# Patient Record
Sex: FEMALE | Race: WHITE | Employment: FULL TIME | URBAN - METROPOLITAN AREA
[De-identification: names, ages, dates, MRNs, and addresses within clinical notes are randomized per-mention and may not be internally consistent; named-entity substitution may affect disease eponyms.]

---

## 2020-01-21 ENCOUNTER — ED HISTORICAL/CONVERTED ENCOUNTER (OUTPATIENT)
Dept: OTHER | Age: 65
End: 2020-01-21

## 2020-03-19 ENCOUNTER — OFFICE VISIT (OUTPATIENT)
Dept: NEUROLOGY | Age: 65
End: 2020-03-19

## 2020-03-19 VITALS
WEIGHT: 144 LBS | OXYGEN SATURATION: 96 % | SYSTOLIC BLOOD PRESSURE: 126 MMHG | HEIGHT: 67 IN | BODY MASS INDEX: 22.6 KG/M2 | HEART RATE: 73 BPM | TEMPERATURE: 98.5 F | DIASTOLIC BLOOD PRESSURE: 86 MMHG | RESPIRATION RATE: 16 BRPM

## 2020-03-19 DIAGNOSIS — G43.111 INTRACTABLE MIGRAINE WITH AURA WITH STATUS MIGRAINOSUS: Primary | ICD-10-CM

## 2020-03-19 DIAGNOSIS — G51.39 HEMIFACIAL SPASM: ICD-10-CM

## 2020-03-19 RX ORDER — LEVOTHYROXINE AND LIOTHYRONINE 38; 9 UG/1; UG/1
TABLET ORAL DAILY
COMMUNITY

## 2020-03-19 RX ORDER — RIZATRIPTAN BENZOATE 10 MG/1
10 TABLET ORAL
COMMUNITY
End: 2020-06-15

## 2020-03-19 RX ORDER — SUMATRIPTAN 100 MG/1
100 TABLET, FILM COATED ORAL
COMMUNITY
End: 2020-06-15

## 2020-03-19 RX ORDER — SPIRONOLACTONE 100 MG/1
200 TABLET, FILM COATED ORAL DAILY
COMMUNITY

## 2020-03-19 RX ORDER — ACETAMINOPHEN 500 MG
10000 TABLET ORAL
COMMUNITY

## 2020-03-19 RX ORDER — METAXALONE 800 MG/1
800 TABLET ORAL
COMMUNITY
End: 2020-06-15

## 2020-03-19 NOTE — PROGRESS NOTES
[facility] and [address] at [facility] at [facility] [address], [address] [facility] 2497 [address] [facility], [address] [place] [address]  [phone] Office  [phone] Facsimile           Referring: Randall Schmidt MD      Chief Complaint   Patient presents with    New Patient    Migraine     66-year-old right-handed woman who comes today for evaluation of what she calls increasing migraine and cheek twitching. In terms of the cheek twitching she has a video on her cell phone that she shows me where she has twitching of the lower portion of her left cheek. This is not rhythmic. It sporadic. She says she had this in January with no inciting factor. It went on for about a week and then spontaneously resolved. She had no closing of the eye with this. No loss or alteration in consciousness. No version of the head. No other associated symptoms. Again never had it before and has not had it since. In terms of the headache she has had headaches for over 20 years. She notes that they would vary in terms of their frequency and intensity but over the last several months they have increased both in their frequency and their intensity. The difficulty she has at this point is that they are lasting several days when she gets them. She says the last 3 days and she is been unable to find an effective abortive agent. Typically they occur in the top of her head temporal or between the eyes. They are described as a pressure throbbing type sensation. She has photophobia and phonophobia sometimes nausea but does not vomit. Does not hurt to move. She does get scintillating scotoma lita-shaped at the onset and sometimes persist throughout the headache and the last headache she had this persisted throughout. She has not had any focal deficits with them. No loss or alteration in consciousness with them.   No weakness numbness tingling. She has never lost consciousness with a headache. Her blood pressure has been controlled. No history of heart disease. She has been using Imitrex and Maxalt to try to abort them. She takes these early right at the onset of her aura and she does repeat a second dose generally at 4 hours not 2 hours but she does not get relief. She did have a good stretch of time over November through January where she had minimal headaches. They have come back with a vengeance occurring once a week now and again lasting 3 days so she is having at least 12-15 headache days per month now. No chest pain palpitations shortness of breath fever chills rash. Past Medical History:   Diagnosis Date    HTN (hypertension)     Hypothyroidism        Past Surgical History:   Procedure Laterality Date    HX HYSTERECTOMY  1979       Current Outpatient Medications   Medication Sig Dispense Refill    spironolactone (ALDACTONE) 100 mg tablet Take 200 mg by mouth daily.  thyroid, Pork, (ARMOUR) 60 mg tablet Take  by mouth daily.  rizatriptan (MAXALT) 10 mg tablet Take 10 mg by mouth once as needed for Migraine. May repeat in 2 hours if needed      SUMAtriptan (IMITREX) 100 mg tablet Take 100 mg by mouth once as needed for Migraine.  metaxalone (SKELAXIN) 800 mg tablet Take 800 mg by mouth three (3) times daily as needed for Muscle Spasm(s).  cholecalciferol (VITAMIN D3) (2,000 UNITS /50 MCG) cap capsule Take 10,000 Units by mouth every Monday, Wednesday, Friday.           Allergies   Allergen Reactions    Iodinated Contrast Media Other (comments)     \"BP plummets\"       Social History     Tobacco Use    Smoking status: Current Every Day Smoker     Packs/day: 0.25    Smokeless tobacco: Never Used   Substance Use Topics    Alcohol use: Not on file    Drug use: Not Currently       Family History   Problem Relation Age of Onset    Breast Cancer Mother     Heart Disease Father    Gabino Whaley Stroke Father     MS Other     Headache Other         cluster       Review of Systems  Pertinent positives and negatives as noted with remainder of comprehensive review negative    Examination  Visit Vitals  /86 (BP 1 Location: Left arm, BP Patient Position: Sitting)   Pulse 73   Temp 98.5 °F (36.9 °C) (Oral)   Resp 16   Ht 5' 6.75\" (1.695 m)   Wt 65.3 kg (144 lb)   SpO2 96%   BMI 22.72 kg/m²     Pleasant, well appearing. Dress and grooming are appropriate. No scleral icterus is present. Oropharynx is clear. Supple neck without bruit appreciated. Heart regular. Pulses are symmetrical.  No edema in the lower extremities. Neurologically, she is awake, alert, and oriented with normal speech and language. Her cognition is normal.    Intact cranial nerves 2-12. No nystagmus. Visual fields full to confrontation. Disk margins are flat bilaterally. She has normal bulk and tone. She has no abnormal movement. She has no pronation or drift. She generates full strength in the upper and lower extremities to direct confrontational testing. Reflexes are symmetrical in the upper and lower extremities bilaterally. No pathologic reflexes are elicited. .  Finger nose finger and rapid alternating movements are normal.  Steady gait. No sensory deficit to primary modalities. The video reviewed on her cell phone demonstrating left sided hemifacial spasm    Impression/Plan  66-year-old lady with a self-limited episode of left-sided hemifacial spasm. We discussed this. Given that it self-limited not much to do at this point. Watchful course. If it returns we discussed Botox and if it returns and I would send her for some additional study. She does note that she has had a recent carotid Doppler and that was normal.    She does have intractable migraine headaches and the problem here is that she does not have an effective abortive regimen.   Given the number of headache days she certainly meets criteria for preventative therapy and we discussed migraine and migraine pathophysiology specifically in the context of CGRP and we discussed using the CGRP inhibitors. We did discuss using preventative strategies however we also discussed that we could take a more directed and minimal medication approach in that if we could use the CGRP inhibition and an abortive fashion to try to treat the headaches more effectively when she gets them we could save her from having to use a preventative strategy. She would like to try that first.  To that end we will use Ubrelvy 100 mg tablets 1 at headache onset repeat in 2 hours if needed. Discussed studies, potential side effects, potential benefits and alternatives. Track headaches as she is doing as well as track response meaning number of tablets needed to effectively abort her headache. I like to see that date after 2 months and when she returns if this is an effective strategy we will continue it. If not we will move on to CGRP inhibition in a preventative role    Follow in 2 months    Vini Kee MD    This note was created using voice recognition software. Despite editing, there may be syntax errors. This note will not be viewable in [address]

## 2020-03-19 NOTE — PROGRESS NOTES
Chief Complaint   Patient presents with    New Patient    Migraine     Has had off and on for 20 yrs, has progressed over the last 8 mo, has not tried any preventative meds    Has had 5 over the last mo, prior to 8 mo ago, would have one maybe every few mo

## 2020-03-19 NOTE — PATIENT INSTRUCTIONS
RESULT POLICY If we have ordered testing for you, know that; \"NO NEWS IS GOOD NEWS! \" It is our policy that we know longer call patients with results, nor do we  give test results over the phone. We schedule follow up appointments so that your results can be discussed in person. This allows you to address any questions you have regarding the results. If you choose to go to an imaging center outside of [facility], it is your responsibility to bring imaging report and disc to follow up appointment. If something of concern is revealed on your test, we will contact you to discuss the matter and if needed schedule a sooner follow up appointment. Additionally, results may be found by using the My Chart feature and one of our patient service representatives at the  can give you instructions on how to access this feature to utilize our electronic medical record system. Thank you for your understanding. PRESCRIPTION REFILL POLICY [facility] Statement to Patients April 1, 2014 In an effort to ensure the large volume of patient prescription refills is processed in the most efficient and expeditious manner, we are asking our patients to assist us by calling your Pharmacy for all prescription refills, this will include also your  Mail Order Pharmacy. The pharmacy will contact our office electronically to continue the refill process. Please do not wait until the last minute to call your pharmacy. We need at least 48 hours (2days) to fill prescriptions. We also encourage you to call your pharmacy before going to  your prescription to make sure it is ready. With regard to controlled substance prescription refill requests (narcotic refills) that need to be picked up at our office, we ask your cooperation by providing us with at least 72 hours (3days) notice that you will need a refill. We will not refill narcotic prescription refill requests after 4:00pm on any weekday, Monday through Thursday, or after 2:00pm on Fridays, or on the weekends. We encourage everyone to explore another way of getting your prescription refill request processed using threadsy, our patient web portal through our electronic medical record system. threadsy is an efficient and effective way to communicate your medication request directly to the office and  downloadable as an tara on your smart phone . threadsy also features a review functionality that allows you to view your medication list as well as leave messages for your physician. Are you ready to get connected? If so please review the attatched instructions or speak to any of our staff to get you set up right away! Thank you so much for your cooperation. Should you have any questions please contact our Practice Administrator.

## 2020-03-21 ENCOUNTER — PATIENT MESSAGE (OUTPATIENT)
Dept: NEUROLOGY | Age: 65
End: 2020-03-21

## 2020-03-23 ENCOUNTER — TELEPHONE (OUTPATIENT)
Dept: NEUROLOGY | Age: 65
End: 2020-03-23

## 2020-03-23 NOTE — TELEPHONE ENCOUNTER
Prior Authorization APPROVED for [employer] by [employer]. Effective dates 03/23/20 - 03/23/21. Case #[id number]. Approval will be scanned into media.  Pharmacy has been notified of approval.

## 2020-03-23 NOTE — TELEPHONE ENCOUNTER
PA initiated through Cover  meds key # [id number] PA Case [id number] Status: Approved.     Pt notified via [employer]

## 2020-03-27 ENCOUNTER — TELEPHONE (OUTPATIENT)
Dept: NEUROLOGY | Age: 65
End: 2020-03-27

## 2020-03-27 NOTE — TELEPHONE ENCOUNTER
----- Message from Ross Kay sent at 3/27/2020 11:10 AM EDT -----  Regarding: Dr. Dawn Dance Message/Vendor Calls    Caller's first and last name: [facility]      Reason for call: Change Rx \"Ubrevly 100 mg\" to 50 mg. The 100 mg is not available.        Callback required yes/no and why: yes      Best contact number(s): [phone]      Details to clarify the request:      Ross Kay

## 2020-03-27 NOTE — TELEPHONE ENCOUNTER
michel laug was sent previously for pt to have this transferred to [facility] MICHELLE [facility] BENJY

## 2020-04-22 DIAGNOSIS — G43.111 INTRACTABLE MIGRAINE WITH AURA WITH STATUS MIGRAINOSUS: ICD-10-CM

## 2020-05-14 ENCOUNTER — TELEPHONE (OUTPATIENT)
Dept: NEUROLOGY | Age: 65
End: 2020-05-14

## 2020-05-14 ENCOUNTER — VIRTUAL VISIT (OUTPATIENT)
Dept: NEUROLOGY | Age: 65
End: 2020-05-14

## 2020-05-14 DIAGNOSIS — G43.111 INTRACTABLE MIGRAINE WITH AURA WITH STATUS MIGRAINOSUS: Primary | ICD-10-CM

## 2020-05-14 NOTE — PROGRESS NOTES
Chief Complaint   Patient presents with    Follow-up     virtual visit--follow up migraines, using Ubrelvy. H/as March 11, 15,16 (2 pills on the 16) also sometimes with auras.

## 2020-05-14 NOTE — PROGRESS NOTES
[facility]   [address]    [place]    Fax        Anupam England is a 59 y.o. female who was seen by synchronous (real-time) audio-video technology on 5/14/2020. Consent:  She and/or her healthcare decision maker is aware that this patient-initiated Telehealth encounter is a billable service, with coverage as determined by her insurance carrier. She is aware that she may receive a bill and has provided verbal consent to proceed: Yes    I was in the office while conducting this encounter. Subjective:   Anupam England was seen for Follow-up (virtual visit--follow up migraines, using Ubrelvy. H/as March 11, 15,16 (2 pills on the 16) also sometimes with auras.)    72-year-old lady seen today for follow-up migraine headache. Her last visit with me was 23 March where I saw her in initial consultation. At that time we started [place] [employer] [place] and had her track her headaches. She notes that she had headaches on may 6 6, 10, 11. She had difficulty getting the Ubrelvy as there were supply chain issues. She has not used it consistently. Prior to Admission medications    Medication Sig Start Date End Date Taking? Authorizing Provider   lisdexamfetamine (Vyvanse) 20 mg capsule Take 1 Cap by mouth daily. Max Daily Amount: 20 mg. 5/14/20  Yes Jesse Ying MD   ubrogepant Veronica Grand) 50 mg tablet Use 1-2 tabs po at HA onset, may repeat in 2 hrs prn for max dose of 4 tabs in 24 hrs 4/22/20  Yes Andre Melchor MD   spironolactone (ALDACTONE) 100 mg tablet Take 200 mg by mouth daily. Yes Provider, Historical   thyroid, Pork, (ARMOUR) 60 mg tablet Take  by mouth daily. Yes Provider, Historical   rizatriptan (MAXALT) 10 mg tablet Take 10 mg by mouth once as needed for Migraine.  May repeat in 2 hours if needed   Yes Provider, Historical   SUMAtriptan (IMITREX) 100 mg tablet Take 100 mg by mouth once as needed for Migraine. Yes Provider, Historical   metaxalone (SKELAXIN) 800 mg tablet Take 800 mg by mouth three (3) times daily as needed for Muscle Spasm(s). Yes Provider, Historical   cholecalciferol (VITAMIN D3) (2,000 UNITS /50 MCG) cap capsule Take 10,000 Units by mouth every Monday, Wednesday, Friday. Yes Provider, Historical     Allergies   Allergen Reactions    Iodinated Contrast Media Other (comments)     \"BP plummets\"         Examination  Was not lady. Awake alert oriented and conversant. Normal speech and language. Normal cognitive function. Due to this being a TeleHealth evaluation, many elements of the physical examination are unable to be assessed. Impression/Plan  Very nice lady with intractable migraine. We discussed options today. We are going to continue to use the He[place] consistently over the next month. We will get back together in 1 month and depending on her headache frequency we will do the following: If she has under 4 headaches per month and the Heide is effective we will continue as is  If she has greater than 4 headaches per month we will discuss using a CGRP preventative  If the [place] is ineffective in any regard we will change to Nurtec    Follow-up in about 1 month    Pursuant to the emergency declaration under the [address] and the [facility], 113 waiver authority and the [employer] General Act, this Virtual  Visit was conducted, with patient's consent, to reduce the patient's risk of exposure to COVID-19 and provide continuity of care for an established patient. Services were provided through a video synchronous discussion virtually to substitute for in-person clinic visit. Velta Burkitt, MD     This document was created with the assistance of voice recognition software and therefore unintended syntax errors may be present despite editing. For any questions please contact me directly. This note will not be viewable in [address]

## 2020-06-12 NOTE — PROGRESS NOTES
Chief Complaint   Patient presents with    Migraine     Migraine f/u.  3 HAs in since 5/14/20. Very happy with Melisa Lopez.   Only taking spironolactone, Ubrelvy, MVI, and Ulysses for thyroid

## 2020-06-15 ENCOUNTER — VIRTUAL VISIT (OUTPATIENT)
Dept: NEUROLOGY | Age: 65
End: 2020-06-15

## 2020-06-15 DIAGNOSIS — G43.111 INTRACTABLE MIGRAINE WITH AURA WITH STATUS MIGRAINOSUS: Primary | ICD-10-CM

## 2020-06-15 NOTE — PROGRESS NOTES
[address] [facility] [address]   [place] [address]    [facility]    Fax        Linda Fair is a 59 y.o. female who was seen by synchronous (real-time) audio-video technology on 6/15/2020. Consent:  She and/or her healthcare decision maker is aware that this patient-initiated Telehealth encounter is a billable service, with coverage as determined by her insurance carrier. She is aware that she may receive a bill and has provided verbal consent to proceed: Yes    I was in the office while conducting this encounter. Subjective:   Linda Fair was seen for Migraine    51-year-old lady seen today for follow-up migraine headache. Her last visit was May 14. I wanted to see her back in about a month as we were right on the edge of needing to be on a preventative. She is using Ubrelvy. Today she reports she has been doing well. She only had 2 migraines. The Collie Standing took them away. They came back to back so she had some recurrence. No side effect. Happy with how she is doing. Prior to Admission medications    Medication Sig Start Date End Date Taking? Authorizing Provider   multivitamin, tx-iron-ca-min (THERA-M w/ IRON) 9 mg iron-400 mcg tab tablet Take 1 Tab by mouth daily. Yes Provider, Historical   ubrogepant Drusilla Schhiralke) 50 mg tablet Use 1-2 tabs po at HA onset, may repeat in 2 hrs prn for max dose of 4 tabs in 24 hrs 4/22/20  Yes Chas Melchor MD   spironolactone (ALDACTONE) 100 mg tablet Take 200 mg by mouth daily. Yes Provider, Historical   thyroid, Pork, (ARMOUR) 60 mg tablet Take  by mouth daily. Yes Provider, Historical   cholecalciferol (VITAMIN D3) (2,000 UNITS /50 MCG) cap capsule Take 10,000 Units by mouth every Monday, Wednesday, Friday. Yes Provider, Historical   rizatriptan (MAXALT) 10 mg tablet Take 10 mg by mouth once as needed for Migraine.  May repeat in 2 hours if needed    Provider, Historical SUMAtriptan (IMITREX) 100 mg tablet Take 100 mg by mouth once as needed for Migraine. Provider, Historical   metaxalone (SKELAXIN) 800 mg tablet Take 800 mg by mouth three (3) times daily as needed for Muscle Spasm(s). Provider, Historical     Allergies   Allergen Reactions    Iodinated Contrast Media Other (comments)     \"BP plummets\"       Examination  She is a very pleasant lady. Awake alert oriented and conversant. Speech and language normal.  Cognition normal.  No icterus. Symmetric face. No ataxia. Due to this being a TeleHealth evaluation, many elements of the physical examination are unable to be assessed. Impression/Plan  Migraine headaches doing well with abortive only treatment with Ubrelvy. Continue this. As long as she has no issues I will see her in 3-4 months. Pursuant to the emergency declaration under the [facility], [address]5 waiver authority and the [employer] Act, this Virtual  Visit was conducted, with patient's consent, to reduce the patient's risk of exposure to COVID-19 and provide continuity of care for an established patient. Services were provided through a video synchronous discussion virtually to substitute for in-person clinic visit. Aleksandra Burnham MD     This document was created with the assistance of voice recognition software and therefore unintended syntax errors may be present despite editing. For any questions please contact me directly. This note will not be viewable in [address]

## 2020-10-02 ENCOUNTER — VIRTUAL VISIT (OUTPATIENT)
Dept: NEUROLOGY | Age: 65
End: 2020-10-02
Payer: COMMERCIAL

## 2020-10-02 DIAGNOSIS — G43.919 INTRACTABLE MIGRAINE WITHOUT STATUS MIGRAINOSUS, UNSPECIFIED MIGRAINE TYPE: Primary | ICD-10-CM

## 2020-10-02 PROCEDURE — 99213 OFFICE O/P EST LOW 20 MIN: CPT | Performed by: NURSE PRACTITIONER

## 2020-10-02 RX ORDER — UBROGEPANT 100 MG/1
TABLET ORAL
COMMUNITY
Start: 2020-07-31 | End: 2020-10-02 | Stop reason: SDUPTHER

## 2020-10-02 RX ORDER — THYROID,PORK 65 MG
TABLET ORAL
COMMUNITY
Start: 2020-07-31

## 2020-10-02 RX ORDER — UBROGEPANT 100 MG/1
TABLET ORAL
Qty: 10 TAB | Refills: 5 | Status: SHIPPED | OUTPATIENT
Start: 2020-10-02

## 2020-10-02 NOTE — PROGRESS NOTES
Dipti Ingram is a 72 y.o. female who was seen by synchronous (real-time) audio-video technology on 10/2/2020 for Follow-up and Migraine (2 x migraines a month been doing good)      FU for migraines. Doing really well since last visit with Dr. Marty Libman. Was exposed to a child on her bus with COVID so is currently in quarantine but doing ok. She has not had a migraine in the month of September. Last migraine was last month in August.   Thinks due to weather. Garret Ingram works really well to help rescue in less then an hour. Has not really needed the 2nd dose at all. She feels like this is working well. No new symptoms with her migraines. She feels like things are going really well on no preventative. Assessment & Plan:       Subjective:       Prior to Admission medications    Medication Sig Start Date End Date Taking? Authorizing Provider   Ubrelvy 100 mg tablet TAKE ONE TABLET BY MOUTH AT THE ONSET OF A HEADACHE MAY REPEAT IN 2 HOURS IF NEEDED (NO MORE THAN 2 TABLETS IN 24 HOURS 7/31/20  Yes Provider, Historical   Nature-Throid 65 mg tab TAKE 1 TABLET BY MOUTH EVERY MORNING ON EMPTY STOMACH 7/31/20  Yes Provider, Historical   multivitamin, tx-iron-ca-min (THERA-M w/ IRON) 9 mg iron-400 mcg tab tablet Take 1 Tab by mouth daily. Yes Provider, Historical   spironolactone (ALDACTONE) 100 mg tablet Take 200 mg by mouth daily. Yes Provider, Historical   thyroid, Pork, (ARMOUR) 60 mg tablet Take  by mouth daily. Yes Provider, Historical   cholecalciferol (VITAMIN D3) (2,000 UNITS /50 MCG) cap capsule Take 10,000 Units by mouth every Monday, Wednesday, Friday. Yes Provider, Historical   ubrogepant Shanika Likens) 50 mg tablet Use 1-2 tabs po at HA onset, may repeat in 2 hrs prn for max dose of 4 tabs in 24 hrs 4/22/20 10/2/20  [place], Lin Pulido MD     There is no problem list on file for this patient. There are no active problems to display for this patient.     Current Outpatient Medications Medication Sig Dispense Refill    Ubrelvy 100 mg tablet TAKE ONE TABLET BY MOUTH AT THE ONSET OF A HEADACHE MAY REPEAT IN 2 HOURS IF NEEDED (NO MORE THAN 2 TABLETS IN 24 HOURS      Nature-Throid 65 mg tab TAKE 1 TABLET BY MOUTH EVERY MORNING ON EMPTY STOMACH      multivitamin, tx-iron-ca-min (THERA-M w/ IRON) 9 mg iron-400 mcg tab tablet Take 1 Tab by mouth daily.  spironolactone (ALDACTONE) 100 mg tablet Take 200 mg by mouth daily.  thyroid, Pork, (ARMOUR) 60 mg tablet Take  by mouth daily.  cholecalciferol (VITAMIN D3) (2,000 UNITS /50 MCG) cap capsule Take 10,000 Units by mouth every Monday, Wednesday, Friday. Allergies   Allergen Reactions    Iodinated Contrast Media Other (comments)     \"BP plummets\"     Past Medical History:   Diagnosis Date    HTN (hypertension)     Hypothyroidism      Past Surgical History:   Procedure Laterality Date    HX HYSTERECTOMY  1979     Family History   Problem Relation Age of Onset   [facility] Breast Cancer Mother     Heart Disease Father     Stroke Father     MS Other     Headache Other         cluster     Social History     Tobacco Use    Smoking status: Current Every Day Smoker     Packs/day: 0.25    Smokeless tobacco: Never Used   Substance Use Topics    Alcohol use: Not on file       Review of Systems   Eyes: Positive for blurred vision and photophobia. Negative for double vision. Gastrointestinal: Negative for nausea and vomiting. Neurological: Positive for headaches. Negative for dizziness, tingling, tremors and sensory change.        Objective:     Patient-Reported Vitals 10/1/2020   Patient-Reported Weight 146   Patient-Reported Height 5 61/2   Patient-Reported Pulse 67   Patient-Reported Temperature 96.4   Patient-Reported SpO2 98   Patient-Reported Systolic  358   Patient-Reported Diastolic 82        [INSTRUCTIONS:  \"[x]\" Indicates a positive item  \"[]\" Indicates a negative item  -- DELETE ALL ITEMS NOT EXAMINED]    Constitutional: [x] Appears well-developed and well-nourished [x] No apparent distress      [] Abnormal -     Mental status: [x] Alert and awake  [x] Oriented to person/place/time [x] Able to follow commands    [] Abnormal -     Eyes:   EOM    [x]  Normal    [] Abnormal -   Sclera  [x]  Normal    [] Abnormal -          Discharge [x]  None visible   [] Abnormal -     HENT: [x] Normocephalic, atraumatic  [] Abnormal -   [x] Mouth/Throat: Mucous membranes are moist    External Ears [x] Normal  [] Abnormal -    Neck: [x] No visualized mass [] Abnormal -     Pulmonary/Chest: [x] Respiratory effort normal   [x] No visualized signs of difficulty breathing or respiratory distress        [] Abnormal -      Musculoskeletal:   [x] Normal gait with no signs of ataxia         [x] Normal range of motion of neck        [] Abnormal -     Neurological:        [x] No Facial Asymmetry (Cranial nerve 7 motor function) (limited exam due to video visit)          [x] No gaze palsy        [] Abnormal -          Skin:        [x] No significant exanthematous lesions or discoloration noted on facial skin         [] Abnormal -            Psychiatric:       [x] Normal Affect [] Abnormal -        [x] No Hallucinations    Other pertinent observable physical exam findings:-    Migraines are doing really well with no prevention  She understands most triggers and tries to avoid. Keep Harl Dries for PRN rescue of migraines moving forward as this works really well. FU 6 months, sooner if needed. We discussed the expected course, resolution and complications of the diagnosis(es) in detail. Medication risks, benefits, costs, interactions, and alternatives were discussed as indicated. I advised her to contact the office if her condition worsens, changes or fails to improve as anticipated. She expressed understanding with the diagnosis(es) and plan.        Tova Echavarria, who was evaluated through a patient-initiated, synchronous (real-time) audio-video encounter, and/or her healthcare decision maker, is aware that it is a billable service, with coverage as determined by her insurance carrier. She provided verbal consent to proceed: Yes, and patient identification was verified. It was conducted pursuant to the emergency declaration under the [address] [place] [address], [address] [place] [address] authority and the [employer] Act. A caregiver was present when appropriate. Ability to conduct physical exam was limited. I was in the office. The patient was at home.       Kimi Chacko NP

## 2020-11-25 ENCOUNTER — APPOINTMENT (OUTPATIENT)
Dept: CT IMAGING | Age: 65
End: 2020-11-25
Attending: PHYSICIAN ASSISTANT
Payer: COMMERCIAL

## 2020-11-25 ENCOUNTER — APPOINTMENT (OUTPATIENT)
Dept: ULTRASOUND IMAGING | Age: 65
End: 2020-11-25
Attending: PHYSICIAN ASSISTANT
Payer: COMMERCIAL

## 2020-11-25 ENCOUNTER — HOSPITAL ENCOUNTER (EMERGENCY)
Age: 65
Discharge: HOME | End: 2020-11-25
Attending: EMERGENCY MEDICINE
Payer: COMMERCIAL

## 2020-11-25 VITALS
OXYGEN SATURATION: 99 % | HEART RATE: 83 BPM | WEIGHT: 144 LBS | SYSTOLIC BLOOD PRESSURE: 135 MMHG | RESPIRATION RATE: 18 BRPM | TEMPERATURE: 97.6 F | HEIGHT: 66 IN | DIASTOLIC BLOOD PRESSURE: 88 MMHG | BODY MASS INDEX: 23.14 KG/M2

## 2020-11-25 DIAGNOSIS — R10.84 ABDOMINAL PAIN, GENERALIZED: ICD-10-CM

## 2020-11-25 DIAGNOSIS — N83.8 OVARIAN MASS, RIGHT: Primary | ICD-10-CM

## 2020-11-25 LAB
ALBUMIN SERPL-MCNC: 4.1 G/DL (ref 3.5–5)
ALBUMIN/GLOB SERPL: 1.4 {RATIO} (ref 1.1–2.2)
ALP SERPL-CCNC: 67 U/L (ref 45–117)
ALT SERPL-CCNC: 17 U/L (ref 12–78)
ANION GAP SERPL CALC-SCNC: 5 MMOL/L (ref 5–15)
APPEARANCE UR: CLEAR
AST SERPL-CCNC: 15 U/L (ref 15–37)
BACTERIA URNS QL MICRO: NEGATIVE /HPF
BASOPHILS # BLD: 0 K/UL (ref 0–0.1)
BASOPHILS NFR BLD: 0 % (ref 0–1)
BILIRUB SERPL-MCNC: 0.5 MG/DL (ref 0.2–1)
BILIRUB UR QL: NEGATIVE
BUN SERPL-MCNC: 16 MG/DL (ref 6–20)
BUN/CREAT SERPL: 20 (ref 12–20)
CALCIUM SERPL-MCNC: 9.4 MG/DL (ref 8.5–10.1)
CHLORIDE SERPL-SCNC: 105 MMOL/L (ref 97–108)
CO2 SERPL-SCNC: 27 MMOL/L (ref 21–32)
COLOR UR: ABNORMAL
COMMENT, HOLDF: NORMAL
CREAT SERPL-MCNC: 0.82 MG/DL (ref 0.55–1.02)
DIFFERENTIAL METHOD BLD: ABNORMAL
EOSINOPHIL # BLD: 0.1 K/UL (ref 0–0.4)
EOSINOPHIL NFR BLD: 1 % (ref 0–7)
EPITH CASTS URNS QL MICRO: ABNORMAL /LPF
ERYTHROCYTE [DISTWIDTH] IN BLOOD BY AUTOMATED COUNT: 13 % (ref 11.5–14.5)
GLOBULIN SER CALC-MCNC: 2.9 G/DL (ref 2–4)
GLUCOSE SERPL-MCNC: 94 MG/DL (ref 65–100)
GLUCOSE UR STRIP.AUTO-MCNC: NEGATIVE MG/DL
HCT VFR BLD AUTO: 40.5 % (ref 35–47)
HGB BLD-MCNC: 13.5 G/DL (ref 11.5–16)
HGB UR QL STRIP: NEGATIVE
HYALINE CASTS URNS QL MICRO: ABNORMAL /LPF (ref 0–5)
IMM GRANULOCYTES # BLD AUTO: 0.1 K/UL (ref 0–0.04)
IMM GRANULOCYTES NFR BLD AUTO: 1 % (ref 0–0.5)
KETONES UR QL STRIP.AUTO: ABNORMAL MG/DL
LEUKOCYTE ESTERASE UR QL STRIP.AUTO: NEGATIVE
LIPASE SERPL-CCNC: 115 U/L (ref 73–393)
LYMPHOCYTES # BLD: 2.3 K/UL (ref 0.8–3.5)
LYMPHOCYTES NFR BLD: 25 % (ref 12–49)
MCH RBC QN AUTO: 30.8 PG (ref 26–34)
MCHC RBC AUTO-ENTMCNC: 33.3 G/DL (ref 30–36.5)
MCV RBC AUTO: 92.5 FL (ref 80–99)
MONOCYTES # BLD: 0.5 K/UL (ref 0–1)
MONOCYTES NFR BLD: 5 % (ref 5–13)
NEUTS SEG # BLD: 6.4 K/UL (ref 1.8–8)
NEUTS SEG NFR BLD: 68 % (ref 32–75)
NITRITE UR QL STRIP.AUTO: NEGATIVE
NRBC # BLD: 0 K/UL (ref 0–0.01)
NRBC BLD-RTO: 0 PER 100 WBC
PH UR STRIP: 5.5 [PH] (ref 5–8)
PLATELET # BLD AUTO: 273 K/UL (ref 150–400)
PMV BLD AUTO: 10 FL (ref 8.9–12.9)
POTASSIUM SERPL-SCNC: 4.4 MMOL/L (ref 3.5–5.1)
PROT SERPL-MCNC: 7 G/DL (ref 6.4–8.2)
PROT UR STRIP-MCNC: NEGATIVE MG/DL
RBC # BLD AUTO: 4.38 M/UL (ref 3.8–5.2)
RBC #/AREA URNS HPF: ABNORMAL /HPF (ref 0–5)
SAMPLES BEING HELD,HOLD: NORMAL
SODIUM SERPL-SCNC: 137 MMOL/L (ref 136–145)
SP GR UR REFRACTOMETRY: 1.01 (ref 1–1.03)
UR CULT HOLD, URHOLD: NORMAL
UROBILINOGEN UR QL STRIP.AUTO: 0.2 EU/DL (ref 0.2–1)
WBC # BLD AUTO: 9.4 K/UL (ref 3.6–11)
WBC URNS QL MICRO: ABNORMAL /HPF (ref 0–4)

## 2020-11-25 PROCEDURE — 36415 COLL VENOUS BLD VENIPUNCTURE: CPT

## 2020-11-25 PROCEDURE — 80053 COMPREHEN METABOLIC PANEL: CPT

## 2020-11-25 PROCEDURE — 81001 URINALYSIS AUTO W/SCOPE: CPT

## 2020-11-25 PROCEDURE — 76775 US EXAM ABDO BACK WALL LIM: CPT

## 2020-11-25 PROCEDURE — 83690 ASSAY OF LIPASE: CPT

## 2020-11-25 PROCEDURE — 85025 COMPLETE CBC W/AUTO DIFF WBC: CPT

## 2020-11-25 PROCEDURE — 74176 CT ABD & PELVIS W/O CONTRAST: CPT

## 2020-11-25 PROCEDURE — 74011250636 HC RX REV CODE- 250/636: Performed by: PHYSICIAN ASSISTANT

## 2020-11-25 PROCEDURE — 99284 EMERGENCY DEPT VISIT MOD MDM: CPT

## 2020-11-25 RX ORDER — ACETAMINOPHEN 325 MG/1
650 TABLET ORAL
Qty: 20 TAB | Refills: 0 | Status: SHIPPED | OUTPATIENT
Start: 2020-11-25

## 2020-11-25 RX ADMIN — SODIUM CHLORIDE 1000 ML: 900 INJECTION, SOLUTION INTRAVENOUS at 11:36

## 2020-11-25 NOTE — DISCHARGE INSTRUCTIONS
Patient Education        Pelvic Pain: Care Instructions  Your Care Instructions     Pelvic pain, or pain in the lower belly, can have many causes. Often pelvic pain is not serious and gets better in a few days. If your pain continues or gets worse, you may need tests and treatment. Tell your doctor about any new symptoms. These may be signs of a serious problem. Follow-up care is a key part of your treatment and safety. Be sure to make and go to all appointments, and call your doctor if you are having problems. It's also a good idea to know your test results and keep a list of the medicines you take. How can you care for yourself at home? · Rest until you feel better. Lie down, and raise your legs by placing a pillow under your knees. · Drink plenty of fluids. You may find that small, frequent sips are easier on your stomach than if you drink a lot at once. Avoid drinks with carbonation or caffeine, such as soda pop, tea, or coffee. · Try eating several small meals instead of 2 or 3 large ones. Eat mild foods, such as rice, dry toast or crackers, bananas, and applesauce. Avoid fatty and spicy foods, other fruits, and alcohol until 48 hours after your symptoms have gone away. · Take an over-the-counter pain medicine, such as acetaminophen (Tylenol), ibuprofen (Advil, Motrin), or naproxen (Aleve). Read and follow all instructions on the label. · Do not take two or more pain medicines at the same time unless the doctor told you to. Many pain medicines have acetaminophen, which is Tylenol. Too much acetaminophen (Tylenol) can be harmful. · You can put a heating pad, a warm cloth, or moist heat on your belly to relieve pain. When should you call for help?    Call your doctor now or seek immediate medical care if:    · You have a new or higher fever.     · You have unusual vaginal bleeding.     · You have new or worse belly or pelvic pain.     · You have vaginal discharge that has increased in amount or smells bad.   Watch closely for changes in your health, and be sure to contact your doctor if:    · You do not get better as expected. Where can you learn more? Go to http://www.gray.com/  Enter B514 in the search box to learn more about \"Pelvic Pain: Care Instructions. \"  Current as of: November 8, 2019               Content Version: 12.6  © 8476-9289 [employer]. Care instructions adapted under license by [employer] (which disclaims liability or warranty for this information). If you have questions about a medical condition or this instruction, always ask your healthcare professional. [place] [address] any warranty or liability for your use of this information.

## 2020-11-25 NOTE — ED TRIAGE NOTES
Pt arrives with the c.c of lower abdominal pain that radiates to the middle of back, denies any n/v/d/urinary symptoms. Pain has been going on for the last 36 hours intermittently; pt was seen by pcp and was told to come to the ED.

## 2020-11-25 NOTE — ED PROVIDER NOTES
80-year-old female with history of hypertension, hypothyroidism presents ambulatory for complaint of bilateral lower abdominal cramping shooting into her back for the past 36 hours, worse after eating. She states she had similar back pain in the past when she was constipated but states she had a normal bowel movement this morning and has not been feeling constipated recently. She saw her PCP this morning who evaluated her and states that they felt a pulsating mass in her abdomen and due to her complaint of abdominal pain recommended her to come to the ER to rule out an aortic aneurysm or dissection. Patient denies a personal history of aortic issues but states there is a family history of aneurysms. Only abdominal surgery was hysterectomy in 1979. She denies urinary symptoms, flank pain, cough, URI symptoms, fever, chills, upper abdominal pain, diarrhea, black or bloody stool, numbness or weakness in extremities or leg edema. She does not take a blood thinner.     Surgical historyhysterectomy 1979  Social history + tobacco, rare EtOH           Past Medical History:   Diagnosis Date    HTN (hypertension)     Hypothyroidism        Past Surgical History:   Procedure Laterality Date    HX HYSTERECTOMY  1979         Family History:   Problem Relation Age of Onset    Breast Cancer Mother     Heart Disease Father     Stroke Father     MS Other     Headache Other         cluster       Social History     Socioeconomic History    Marital status:      Spouse name: Not on file    Number of children: Not on file    Years of education: Not on file    Highest education level: Not on file   Occupational History    Not on file   Social Needs    Financial resource strain: Not on file    Food insecurity     Worry: Not on file     Inability: Not on file    Transportation needs     Medical: Not on file     Non-medical: Not on file   Tobacco Use    Smoking status: Current Every Day Smoker     Packs/day: 0.25    Smokeless tobacco: Never Used   Substance and Sexual Activity    Alcohol use: Not on file    Drug use: Not Currently    Sexual activity: Not on file   Lifestyle    Physical activity     Days per week: Not on file     Minutes per session: Not on file    Stress: Not on file   Relationships    Social connections     Talks on phone: Not on file     Gets together: Not on file     Attends [employer] service: Not on file     Active member of club or organization: Not on file     Attends meetings of clubs or organizations: Not on file     Relationship status: Not on file    Intimate partner violence     Fear of current or ex partner: Not on file     Emotionally abused: Not on file     Physically abused: Not on file     Forced sexual activity: Not on file   Other Topics Concern    Not on file   Social History Narrative    Not on file         ALLERGIES: Iodinated contrast media    Review of Systems   Constitutional: Negative. Negative for activity change, chills, fatigue and unexpected weight change. HENT: Negative for trouble swallowing. Respiratory: Negative for cough, chest tightness, shortness of breath and wheezing. Cardiovascular: Negative. Negative for chest pain and palpitations. Gastrointestinal: Positive for abdominal pain. Negative for diarrhea, nausea and vomiting. Lower abd cramping   Genitourinary: Negative. Negative for dysuria, flank pain, frequency, hematuria and pelvic pain. Musculoskeletal: Positive for back pain. Negative for arthralgias, neck pain and neck stiffness. Skin: Negative. Negative for color change and rash. Neurological: Negative. Negative for dizziness, numbness and headaches. All other systems reviewed and are negative. Vitals:    11/25/20 1043   BP: (!) 153/93   Pulse: 82   Resp: 18   Temp: 97.6 °F (36.4 °C)   SpO2: 100%   Weight: 65.3 kg (144 lb)   Height: 5' 6\" (1.676 m)            Physical Exam  Vitals signs and nursing note reviewed. Constitutional:       General: She is not in acute distress. Appearance: She is well-developed. She is not toxic-appearing or diaphoretic. Comments: Thin WF   HENT:      Head: Normocephalic and atraumatic. Eyes:      General:         Right eye: No discharge. Left eye: No discharge. Conjunctiva/sclera: Conjunctivae normal.      Pupils: Pupils are equal, round, and reactive to light. Neck:      Musculoskeletal: Full passive range of motion without pain and normal range of motion. Trachea: No tracheal tenderness. Cardiovascular:      Rate and Rhythm: Normal rate and regular rhythm. Pulses: Normal pulses. Heart sounds: Normal heart sounds. No murmur. No friction rub. No gallop. Pulmonary:      Effort: Pulmonary effort is normal. No respiratory distress. Breath sounds: Normal breath sounds. No wheezing or rales. Chest:      Chest wall: No tenderness. Abdominal:      General: Bowel sounds are normal. There is no distension. Palpations: Abdomen is soft. Tenderness: There is abdominal tenderness in the right lower quadrant and left lower quadrant. There is no guarding or rebound. Musculoskeletal: Normal range of motion. General: No tenderness. Skin:     General: Skin is warm and dry. Capillary Refill: Capillary refill takes less than 2 seconds. Findings: No abrasion, erythema or rash. Neurological:      Mental Status: She is alert and oriented to person, place, and time. Cranial Nerves: No cranial nerve deficit. Sensory: No sensory deficit.       Coordination: Coordination normal.   Psychiatric:         Speech: Speech normal.         Behavior: Behavior normal.          MDM  Number of Diagnoses or Management Options  Diagnosis management comments:   Ddx: Intra-abdominal infection, colitis, diverticulitis, appendicitis, pyelonephritis, UTI       Amount and/or Complexity of Data Reviewed  Clinical lab tests: ordered and reviewed  Tests in the radiology section of CPT®: ordered and reviewed  Review and summarize past medical records: yes  Discuss the patient with other providers: yes    Patient Progress  Patient progress: stable         Procedures    I discussed patient's PMH, exam findings as well as careplan with the ER attending who agrees with care plan. Summer Delgado PA-C    Patient has an allergy to iodinated contrast media, states she has had it twice in her life and both times she states \"my blood pressure dropped significantly, the last thing I remember before I passed out was them saying 60/40 and when I woke up I was in the ER. \"  I spoke with radiologist on call and discussed patient's severe side effect to IV dye causing significant hypotension and he recommends ultrasound of aorta and if abnormal will discuss options for CTA if still medically indicated. Summer Delgado PA-C      I discussed CT findings with patient and daughter at the bedside at her request. Advised CT shows mass of the R ovary, cannot r/o neoplasm and close f/u with gyh-onc encouraged. CONSULT NOTE:   3:08 PM  Summer Delgado PA-C spoke with Dr. Yesica Monroy,   Specialty: ONC/GYN  Discussed pt's hx, disposition, and available diagnostic and imaging results. Reviewed care plans. Consultant agrees with plans as outlined. He states due to the holiday the office is closing soon, will open Monday morning and advised to have patient call first thing Monday at 8;30am to have her be seen in the office next week. Written by Summer Delgado PA-C.    LABORATORY TESTS:  Recent Results (from the past 12 hour(s))   SAMPLES BEING HELD    Collection Time: 11/25/20 11:34 AM   Result Value Ref Range    SAMPLES BEING HELD GREEN,BLUE,RED     COMMENT        Add-on orders for these samples will be processed based on acceptable specimen integrity and analyte stability, which may vary by analyte.    CBC WITH AUTOMATED DIFF    Collection Time: 11/25/20 11:34 AM   Result Value Ref Range    WBC 9.4 3.6 - 11.0 K/uL    RBC 4.38 3.80 - 5.20 M/uL    HGB 13.5 11.5 - 16.0 g/dL    HCT 40.5 35.0 - 47.0 %    MCV 92.5 80.0 - 99.0 FL    MCH 30.8 26.0 - 34.0 PG    MCHC 33.3 30.0 - 36.5 g/dL    RDW 13.0 11.5 - 14.5 %    PLATELET 881 950 - 413 K/uL    MPV 10.0 8.9 - 12.9 FL    NRBC 0.0 0  WBC    ABSOLUTE NRBC 0.00 0.00 - 0.01 K/uL    NEUTROPHILS 68 32 - 75 %    LYMPHOCYTES 25 12 - 49 %    MONOCYTES 5 5 - 13 %    EOSINOPHILS 1 0 - 7 %    BASOPHILS 0 0 - 1 %    IMMATURE GRANULOCYTES 1 (H) 0.0 - 0.5 %    ABS. NEUTROPHILS 6.4 1.8 - 8.0 K/UL    ABS. LYMPHOCYTES 2.3 0.8 - 3.5 K/UL    ABS. MONOCYTES 0.5 0.0 - 1.0 K/UL    ABS. EOSINOPHILS 0.1 0.0 - 0.4 K/UL    ABS. BASOPHILS 0.0 0.0 - 0.1 K/UL    ABS. IMM. GRANS. 0.1 (H) 0.00 - 0.04 K/UL    DF AUTOMATED     METABOLIC PANEL, COMPREHENSIVE    Collection Time: 11/25/20 11:34 AM   Result Value Ref Range    Sodium 137 136 - 145 mmol/L    Potassium 4.4 3.5 - 5.1 mmol/L    Chloride 105 97 - 108 mmol/L    CO2 27 21 - 32 mmol/L    Anion gap 5 5 - 15 mmol/L    Glucose 94 65 - 100 mg/dL    BUN 16 6 - 20 MG/DL    Creatinine 0.82 0.55 - 1.02 MG/DL    BUN/Creatinine ratio 20 12 - 20      GFR est AA >60 >60 ml/min/1.73m2    GFR est non-AA >60 >60 ml/min/1.73m2    Calcium 9.4 8.5 - 10.1 MG/DL    Bilirubin, total 0.5 0.2 - 1.0 MG/DL    ALT (SGPT) 17 12 - 78 U/L    AST (SGOT) 15 15 - 37 U/L    Alk.  phosphatase 67 45 - 117 U/L    Protein, total 7.0 6.4 - 8.2 g/dL    Albumin 4.1 3.5 - 5.0 g/dL    Globulin 2.9 2.0 - 4.0 g/dL    A-G Ratio 1.4 1.1 - 2.2     LIPASE    Collection Time: 11/25/20 11:34 AM   Result Value Ref Range    Lipase 115 73 - 393 U/L   URINALYSIS W/MICROSCOPIC    Collection Time: 11/25/20  1:02 PM   Result Value Ref Range    Color YELLOW/STRAW      Appearance CLEAR CLEAR      Specific gravity 1.008 1.003 - 1.030      pH (UA) 5.5 5.0 - 8.0      Protein Negative NEG mg/dL    Glucose Negative NEG mg/dL    Ketone TRACE (A) NEG mg/dL Bilirubin Negative NEG      Blood Negative NEG      Urobilinogen 0.2 0.2 - 1.0 EU/dL    Nitrites Negative NEG      Leukocyte Esterase Negative NEG      WBC 0-4 0 - 4 /hpf    RBC 0-5 0 - 5 /hpf    Epithelial cells FEW FEW /lpf    Bacteria Negative NEG /hpf    Hyaline cast 0-2 0 - 5 /lpf   URINE CULTURE HOLD SAMPLE    Collection Time: 11/25/20  1:02 PM    Specimen: Serum; Urine   Result Value Ref Range    Urine culture hold        Urine on hold in Microbiology dept for 2 days. If unpreserved urine is submitted, it cannot be used for addtional testing after 24 hours, recollection will be required. IMAGING RESULTS:  Ct Abd Pelv Wo Cont    Result Date: 11/25/2020  IMPRESSION: 9.6 cm right ovarian cystic mass, suspicious for ovarian neoplasm. No other mass or lymphadenopathy is evident. [employer]    Result Date: 11/25/2020  IMPRESSION: Normal ultrasound examination of the abdominal aorta. Complex left pelvic cystic mass suspicious for cystic ovarian neoplasm. MEDICATIONS GIVEN:  Medications   sodium chloride 0.9 % bolus infusion 1,000 mL (1,000 mL IntraVENous New Bag 11/25/20 1136)         DISCHARGE NOTE:  3:09 PM  The patient's results have been reviewed with them and/or available family. Patient and/or family verbally conveyed their understanding and agreement of the patient's signs, symptoms, diagnosis, treatment and prognosis and additionally agree to follow up as recommended in the discharge instructions or to return to the Emergency Room should their condition change prior to their follow-up appointment. The patient/family verbally agrees with the care-plan and verbally conveys that all of their questions have been answered.  The discharge instructions have also been provided to the patient and/or family with some educational information regarding the patient's diagnosis as well a list of reasons why the patient would want to return to the ER prior to their follow-up appointment, should their condition change. Plan:  1. F/U with gyn-oncology next week  2.  Return precautions discussed  Return precautions discussed and advised to return to ER if worse

## 2022-11-21 RX ORDER — UBROGEPANT 100 MG/1
TABLET ORAL
Qty: 10 TABLET | Refills: 3 | Status: SHIPPED | OUTPATIENT
Start: 2022-11-21

## 2023-02-09 ENCOUNTER — TRANSCRIBE ORDER (OUTPATIENT)
Dept: SCHEDULING | Age: 68
End: 2023-02-09

## 2023-02-09 DIAGNOSIS — R10.84 GENERALIZED ABDOMINAL PAIN: Primary | ICD-10-CM

## 2023-02-15 ENCOUNTER — HOSPITAL ENCOUNTER (OUTPATIENT)
Dept: ULTRASOUND IMAGING | Age: 68
Discharge: HOME | End: 2023-02-15
Payer: COMMERCIAL

## 2023-02-15 ENCOUNTER — HOSPITAL ENCOUNTER (OUTPATIENT)
Dept: ULTRASOUND IMAGING | Age: 68
End: 2023-02-15
Payer: COMMERCIAL

## 2023-02-15 DIAGNOSIS — R10.84 GENERALIZED ABDOMINAL PAIN: ICD-10-CM

## 2023-02-15 PROCEDURE — 76700 US EXAM ABDOM COMPLETE: CPT

## 2023-02-15 PROCEDURE — 76856 US EXAM PELVIC COMPLETE: CPT

## 2023-02-21 ENCOUNTER — OFFICE VISIT (OUTPATIENT)
Dept: NEUROLOGY | Age: 68
End: 2023-02-21
Payer: COMMERCIAL

## 2023-02-21 ENCOUNTER — TELEPHONE (OUTPATIENT)
Dept: NEUROLOGY | Age: 68
End: 2023-02-21

## 2023-02-21 VITALS
RESPIRATION RATE: 14 BRPM | HEART RATE: 90 BPM | SYSTOLIC BLOOD PRESSURE: 106 MMHG | DIASTOLIC BLOOD PRESSURE: 84 MMHG | OXYGEN SATURATION: 99 %

## 2023-02-21 DIAGNOSIS — H53.2 DIPLOPIA: Primary | ICD-10-CM

## 2023-02-21 DIAGNOSIS — R42 VERTIGO: ICD-10-CM

## 2023-02-21 DIAGNOSIS — G43.109 MIGRAINE WITH AURA AND WITHOUT STATUS MIGRAINOSUS, NOT INTRACTABLE: ICD-10-CM

## 2023-02-21 PROCEDURE — 1123F ACP DISCUSS/DSCN MKR DOCD: CPT | Performed by: PSYCHIATRY & NEUROLOGY

## 2023-02-21 PROCEDURE — 99214 OFFICE O/P EST MOD 30 MIN: CPT | Performed by: PSYCHIATRY & NEUROLOGY

## 2023-02-21 RX ORDER — UBROGEPANT 100 MG/1
TABLET ORAL
Qty: 16 TABLET | Refills: 3 | Status: SHIPPED | OUTPATIENT
Start: 2023-02-21

## 2023-02-21 RX ORDER — RIMEGEPANT SULFATE 75 MG/75MG
TABLET, ORALLY DISINTEGRATING ORAL
Qty: 8 TABLET | Refills: 3 | Status: SHIPPED | OUTPATIENT
Start: 2023-02-21

## 2023-02-21 NOTE — TELEPHONE ENCOUNTER
[employer] PA initiated through Cover my meds key # -  [id number] - PA Case ID: [id number] - Rx #: [id number]    Approvedtoday  PA Case: [id number], Status: Approved, Coverage Starts on: 2/21/2023 12:00:00 AM, Coverage Ends on: 2/21/2024 12:00:00 AM

## 2023-02-21 NOTE — PROGRESS NOTES
[facility] and [address] at [facility] Neurology [facility] at [facility], [address] [facility] [address] [facility], [address] [place] [address]   [phone]              Chief Complaint   Patient presents with    Visual Problems     Migraine. A couple of months ago had episode of dizziness lasting 15 sec while driving then double vision in right eye that has not fully resolved. Current Outpatient Medications   Medication Sig Dispense Refill    Ubrelvy 100 mg tablet TAKE ONE TABLET BY MOUTH AT THE ONSET OF A HEADACHE MAY REPEAT IN 2 HOURS IF NEEDED (NO MORE THAN 2 TABLETS IN 24 HOURS 10 Tablet 3    acetaminophen (TYLENOL) 325 mg tablet Take 2 Tabs by mouth every four (4) hours as needed for Pain. 20 Tab 0    Nature-Throid 65 mg tab TAKE 1 TABLET BY MOUTH EVERY MORNING ON EMPTY STOMACH      multivitamin, tx-iron-ca-min (THERA-M w/ IRON) 9 mg iron-400 mcg tab tablet Take 1 Tab by mouth daily. spironolactone (ALDACTONE) 100 mg tablet Take 200 mg by mouth daily. thyroid, Pork, (ARMOUR) 60 mg tablet Take  by mouth daily. cholecalciferol (VITAMIN D3) (2,000 UNITS /50 MCG) cap capsule Take 10,000 Units by mouth every Monday, Wednesday, Friday. Allergies   Allergen Reactions    Iodinated Contrast Media Other (comments)     \"BP plummets\"     Social History     Tobacco Use    Smoking status: Passive Smoke Exposure - Never Smoker    Smokeless tobacco: Never   Substance Use Topics    Alcohol use: Yes     Alcohol/week: 1.0 standard drink     Types: 1 Glasses of wine per week    Drug use: Never     80-year-old lady returns today for follow-up. She was last seen in June 2020 for migraine. She was using Ubrelvy. She had only had infrequent migraine at that time. We continued with an abortive only strategy. She then followed with nurse practitioner Dinah October 2020 and she was still doing well.   Jassi Dhillon was continued. Today she reports her migraines are doing well. They are infrequent. Kayla Burger works well but she is having insurance issues. She has a new issue today and that is an episode she had where she was driving and all of a sudden became vertiginous. She had double vision and she relates the double vision was only in her right eye. If she closes the right eye she can see properly. If she closed the left eye she was seeing double. It lasted about 15 seconds for the vertigo and the diplopia lasted about 30 seconds. She had to pull over. No headache. No other associated symptoms such as perioral numbness, difficulty with tongue numbness, numbness of the extremities or weakness. She saw Dr. Naomy Butler and she saw a retina specialist afterwards who then did laser surgery on her left eye but not her right. She is following up with her routine ophthalmologist shortly. She has never had this before and has not had it since. Examination  There were no vitals taken for this visit. Visit Vitals  /84   Pulse 90   Resp 14   SpO2 99%     Awake, alert and oriented. No icterus. CN intact 2-12 without nystagmus. No pronation or drift. Resists fully in all 4 extrems. DTR symmetric in all 4 extremities. No ataxia. Steady gait. Impression/Plan  Migraine headaches stable  I would like to continue Ubrelvy but she is not able to get it and I do not have any samples today  Her insurance will cover Nurtec and she has failed Imitrex and Maxalt in the past  If the Nurtec is not effective then we will get a prior authorization for Ubrelvy    Episode of vertigo and associated diplopia  MRI of the brain  Carotid Doppler  Acetylcholine receptor antibody panel    Follow-up after testing      Rona Richards MD        This note was created using voice recognition software. Despite editing, there may be syntax errors.

## 2023-02-23 LAB
ACHR BIND AB SER-SCNC: 0.03 NMOL/L (ref 0–0.24)
ACHR BLOCK AB SER-ACNC: 12 % (ref 0–25)
ACHR MOD AB/ACHR TOTAL SFR SER: NORMAL %

## 2023-02-27 ENCOUNTER — HOSPITAL ENCOUNTER (OUTPATIENT)
Dept: MRI IMAGING | Age: 68
Discharge: HOME | End: 2023-02-27
Attending: PSYCHIATRY & NEUROLOGY
Payer: COMMERCIAL

## 2023-02-27 DIAGNOSIS — H53.2 DIPLOPIA: ICD-10-CM

## 2023-02-27 DIAGNOSIS — R42 VERTIGO: ICD-10-CM

## 2023-02-27 PROCEDURE — 70551 MRI BRAIN STEM W/O DYE: CPT

## 2023-04-22 DIAGNOSIS — R10.84 GENERALIZED ABDOMINAL PAIN: Primary | ICD-10-CM

## 2023-05-24 ENCOUNTER — OFFICE VISIT (OUTPATIENT)
Age: 68
End: 2023-05-24
Payer: COMMERCIAL

## 2023-05-24 VITALS
DIASTOLIC BLOOD PRESSURE: 78 MMHG | RESPIRATION RATE: 14 BRPM | SYSTOLIC BLOOD PRESSURE: 110 MMHG | HEART RATE: 68 BPM | OXYGEN SATURATION: 97 %

## 2023-05-24 DIAGNOSIS — G43.109 MIGRAINE WITH AURA, NOT INTRACTABLE, WITHOUT STATUS MIGRAINOSUS: Primary | ICD-10-CM

## 2023-05-24 PROCEDURE — 99214 OFFICE O/P EST MOD 30 MIN: CPT | Performed by: NURSE PRACTITIONER

## 2023-05-24 PROCEDURE — 1123F ACP DISCUSS/DSCN MKR DOCD: CPT | Performed by: NURSE PRACTITIONER

## 2023-05-24 RX ORDER — UBROGEPANT 100 MG/1
TABLET ORAL
Qty: 16 TABLET | Refills: 5 | Status: SHIPPED | OUTPATIENT
Start: 2023-05-24

## 2023-05-24 NOTE — PROGRESS NOTES
ACETYLCHOLINE RECEPTOR PANEL  Order: [id number]  Collected 2/21/2023 13:20     Status: Edited Result - FINAL     Next appt: None     Dx: Diplopia; Vertigo     0 Result Notes       Component Ref Range & Units 2/21/23 1320   AChR Binding Ab, serum 0.00 - 0.24 nmol/L 0.03    Comment:                                Negative:   0.00 - 0.24                                  Borderline: 0.25 - 0.40                                  Positive:         >0.40    AChR Blocking Ab 0 - 25 % 12    Comment:                                Negative:      0 - 25                                  Borderline:   26 - 30                                  Positive:         >30    AChR Modulating Ab % CANCELED    Comment: Test not performed. Unable to perform test due to current   unavailability of reagents or discontinuation of test.                                  Negative:         <21                                  Equivocal:    21 - 25                                  Positive:         >25   **Effective October 27, 2021, test 263801 AChR**   Modulating Abs, Serum was made non-orderable due   to an ongoing reagent issue. No replacement is   currently available. Result canceled by the ancillary. Resulting Agency  01           Narrative  Performed by: Vikas Funk) [id number] Blocking Abs, Serum   This test was developed and its performance characteristics   determined by Jeff Schneider. It has not been cleared or approved   by the Food and Drug Administration. Test(s) V113014 Modulating Abs, Serum   was developed and its performance characteristics determined   by Jeff Schneider. It has not been cleared or approved by the Food   and Drug Administration.    Performed at:  [facility]   [address]  [id number]   : Liliam Frank MD, Phone:  [phone]      Specimen Collected: 02/21/23 13:20 Last Resulted: 02/23/23 16:35

## 2023-05-25 NOTE — PROGRESS NOTES
Milagro Whitaker is a 79 y.o. female who presents with the following  Chief Complaint   Patient presents with    Migraine     Doing better, dulce since getting Ubrelvy. Approx 4 per mo, intermixed w and w/o auras. Blue light on screens are a trigger       HPI      FU migraines. Doing well on current treatment. Not needing much at all   Has not had any double vision, headaches much. 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 She is feeling good today         Allergies   Allergen Reactions    Iodinated Contrast Media Other (See Comments)     \"BP plummets\"       Current Outpatient Medications   Medication Sig Dispense Refill    Ubrogepant (UBRELVY) 100 MG TABS TAKE ONE TABLET BY MOUTH AT THE ONSET OF A HEADACHE MAY REPEAT IN 2 HOURS IF NEEDED (NO MORE THAN 2 TABLETS IN 24 HOURS 16 tablet 5    acetaminophen (TYLENOL) 325 MG tablet Take 2 tablets by mouth every 4 hours as needed      Cholecalciferol 50 MCG (2000 UT) CAPS Take 10,000 Units by mouth      spironolactone (ALDACTONE) 100 MG tablet Take 2 tablets by mouth daily      Thyroid 48.75 MG TABS        No current facility-administered medications for this visit. Social History     Tobacco Use   Smoking Status Passive Smoke Exposure - Never Smoker   Smokeless Tobacco Never       Past Medical History:   Diagnosis Date    Headache 9/2019    HTN (hypertension)     Hypothyroidism        Past Surgical History:   Procedure Laterality Date    HYSTERECTOMY (CERVIX STATUS UNKNOWN)  1979       Family History   Problem Relation Age of Onset    Stroke Father     Mult Sclerosis Other     Heart Disease Father     Headache Other         cluster    Breast Cancer Mother        Social History     Socioeconomic History    Marital status:    Tobacco Use    Smoking status: Passive Smoke Exposure - Never Smoker    Smokeless tobacco: Never   Substance and Sexual Activity    Alcohol use:  Yes     Alcohol/week: 1.0 standard drink    Drug use: Never

## 2024-01-31 ENCOUNTER — OFFICE VISIT (OUTPATIENT)
Age: 69
End: 2024-01-31
Payer: COMMERCIAL

## 2024-01-31 VITALS
WEIGHT: 134 LBS | OXYGEN SATURATION: 97 % | BODY MASS INDEX: 21.03 KG/M2 | SYSTOLIC BLOOD PRESSURE: 122 MMHG | RESPIRATION RATE: 18 BRPM | HEIGHT: 67 IN | DIASTOLIC BLOOD PRESSURE: 80 MMHG

## 2024-01-31 DIAGNOSIS — H92.02 ACUTE OTALGIA, LEFT: ICD-10-CM

## 2024-01-31 DIAGNOSIS — H61.22 IMPACTED CERUMEN OF LEFT EAR: ICD-10-CM

## 2024-01-31 DIAGNOSIS — R42 VERTIGO: Primary | ICD-10-CM

## 2024-01-31 PROCEDURE — 99203 OFFICE O/P NEW LOW 30 MIN: CPT | Performed by: OTOLARYNGOLOGY

## 2024-01-31 PROCEDURE — 69210 REMOVE IMPACTED EAR WAX UNI: CPT | Performed by: OTOLARYNGOLOGY

## 2024-01-31 PROCEDURE — 1123F ACP DISCUSS/DSCN MKR DOCD: CPT | Performed by: OTOLARYNGOLOGY

## 2024-01-31 ASSESSMENT — ENCOUNTER SYMPTOMS
APNEA: 0
ABDOMINAL PAIN: 0
SINUS PAIN: 0
WHEEZING: 0
SINUS PRESSURE: 0
SORE THROAT: 0
CHOKING: 0
EYE ITCHING: 0
TROUBLE SWALLOWING: 0
NAUSEA: 0
VOMITING: 0
EYE DISCHARGE: 0
SHORTNESS OF BREATH: 0
BACK PAIN: 0
STRIDOR: 0
VOICE CHANGE: 0
COUGH: 0
PHOTOPHOBIA: 0

## 2024-01-31 NOTE — PROGRESS NOTES
Subjective:    Gretchen Selby   68 y.o.   1955     New Patient Visit    Chief Complaint   Patient presents with    New Patient    Dizziness     History of Present Illness:    1/31/24 -  office    69 yo female presents with left ear pain and 1 severe episode of vertigo around 3 weeks ago.  Occurred suddenly when turning her head while standing in kitchen.  Symptoms lasted 2 days.  She was nauseous and vomited.  Called pcp, got meclizine and eventually improved.  No prior episode of anything similar.  Left ear has been tender on/off since October.  She has had some intermittent left tinnitus.    Review of Systems  Review of Systems   Constitutional:  Negative for appetite change, chills, fatigue and fever.   HENT:  Positive for ear pain. Negative for congestion, ear discharge, nosebleeds, postnasal drip, sinus pressure, sinus pain, sneezing, sore throat, tinnitus, trouble swallowing and voice change.    Eyes:  Negative for photophobia, discharge, itching and visual disturbance.   Respiratory:  Negative for apnea, cough, choking, shortness of breath, wheezing and stridor.    Cardiovascular:  Negative for chest pain and palpitations.   Gastrointestinal:  Negative for abdominal pain, nausea and vomiting.   Endocrine: Negative for cold intolerance and heat intolerance.   Genitourinary:  Negative for difficulty urinating and dysuria.   Musculoskeletal:  Negative for arthralgias, back pain, gait problem, myalgias, neck pain and neck stiffness.   Skin:  Negative for rash and wound.   Allergic/Immunologic: Negative for environmental allergies and food allergies.   Neurological:  Positive for dizziness. Negative for speech difficulty, light-headedness and headaches.   Hematological:  Negative for adenopathy. Does not bruise/bleed easily.   Psychiatric/Behavioral:  Negative for confusion and sleep disturbance. The patient is not nervous/anxious.            Past Medical History:   Diagnosis Date    Headache 9/2019    HTN

## 2024-04-24 ENCOUNTER — OFFICE VISIT (OUTPATIENT)
Age: 69
End: 2024-04-24
Payer: COMMERCIAL

## 2024-04-24 VITALS
SYSTOLIC BLOOD PRESSURE: 124 MMHG | HEART RATE: 65 BPM | BODY MASS INDEX: 21.35 KG/M2 | DIASTOLIC BLOOD PRESSURE: 64 MMHG | OXYGEN SATURATION: 100 % | HEIGHT: 67 IN | TEMPERATURE: 97.3 F | RESPIRATION RATE: 16 BRPM | WEIGHT: 136 LBS

## 2024-04-24 DIAGNOSIS — G43.109 MIGRAINE WITH AURA, NOT INTRACTABLE, WITHOUT STATUS MIGRAINOSUS: ICD-10-CM

## 2024-04-24 PROCEDURE — 1123F ACP DISCUSS/DSCN MKR DOCD: CPT | Performed by: NURSE PRACTITIONER

## 2024-04-24 PROCEDURE — 99214 OFFICE O/P EST MOD 30 MIN: CPT | Performed by: NURSE PRACTITIONER

## 2024-04-24 RX ORDER — UBROGEPANT 100 MG/1
TABLET ORAL
Qty: 16 TABLET | Refills: 11 | Status: SHIPPED | OUTPATIENT
Start: 2024-04-24

## 2024-04-24 NOTE — PROGRESS NOTES
Gretchen Selby is a 68 y.o. female who presents with the following  Chief Complaint   Patient presents with    Migraine     Patient states she has had 0 headaches and 2 migraines, and several visual migraines.        HPI    Yearly follow-up for migraines  She has had a better year here thus far  She was significantly debilitated in January  She had a bad migraine pretty much every single day  She is not sure what triggered it  She had a bout of vertigo and dizziness went to the ENT  She also had some work done on her eyes  February got a little bit better in March she has been doing okay and so far she has had about 2 migraines in the last month  She has had multiple visual migraines but only a third of these go into a full out pain type migraine    She does use the Ubrelvy and this works significantly quick  She has failed Nurtec and it made her feel like she had an upset stomach  She has also failed Imitrex and Relpax and Maxalt    Migraines are unilateral with sharp stabbing pain    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 [employer]  She will be going to [place] here in June and is very excited about this    Allergies   Allergen Reactions    Iodinated Contrast Media Other (See Comments)     \"BP plummets\"       Current Outpatient Medications   Medication Sig Dispense Refill    Ubrogepant (UBRELVY) 100 MG TABS TAKE ONE TABLET BY MOUTH AT THE ONSET OF A HEADACHE MAY REPEAT IN 2 HOURS IF NEEDED (NO MORE THAN 2 TABLETS IN 24 HOURS 16 tablet 11    acetaminophen (TYLENOL) 325 MG tablet Take 2 tablets by mouth every 4 hours as needed      Cholecalciferol 50 MCG (2000 UT) CAPS Take 5 capsules by mouth      spironolactone (ALDACTONE) 100 MG tablet Take 2 tablets by mouth daily      Thyroid 48.75 MG TABS        No current facility-administered medications for this visit.        Social History     Tobacco Use   Smoking Status Never    Passive exposure: Yes   Smokeless Tobacco Never       Past

## 2024-05-06 ENCOUNTER — HOSPITAL ENCOUNTER (EMERGENCY)
Facility: HOSPITAL | Age: 69
Discharge: HOME | End: 2024-05-06
Attending: EMERGENCY MEDICINE
Payer: COMMERCIAL

## 2024-05-06 ENCOUNTER — APPOINTMENT (OUTPATIENT)
Facility: HOSPITAL | Age: 69
End: 2024-05-06
Payer: COMMERCIAL

## 2024-05-06 VITALS
WEIGHT: 135 LBS | HEIGHT: 67 IN | OXYGEN SATURATION: 99 % | RESPIRATION RATE: 16 BRPM | HEART RATE: 61 BPM | BODY MASS INDEX: 21.19 KG/M2 | DIASTOLIC BLOOD PRESSURE: 86 MMHG | SYSTOLIC BLOOD PRESSURE: 129 MMHG | TEMPERATURE: 97.8 F

## 2024-05-06 DIAGNOSIS — K59.00 CONSTIPATION, UNSPECIFIED CONSTIPATION TYPE: Primary | ICD-10-CM

## 2024-05-06 LAB
ALBUMIN SERPL-MCNC: 3.9 G/DL (ref 3.5–5)
ALBUMIN/GLOB SERPL: 1.3 (ref 1.1–2.2)
ALP SERPL-CCNC: 50 U/L (ref 45–117)
ALT SERPL-CCNC: 20 U/L (ref 12–78)
ANION GAP SERPL CALC-SCNC: 5 MMOL/L (ref 5–15)
APPEARANCE UR: CLEAR
AST SERPL W P-5'-P-CCNC: 22 U/L (ref 15–37)
BACTERIA URNS QL MICRO: NEGATIVE /HPF
BASOPHILS # BLD: 0.1 K/UL (ref 0–0.1)
BASOPHILS NFR BLD: 1 % (ref 0–1)
BILIRUB SERPL-MCNC: 0.4 MG/DL (ref 0.2–1)
BILIRUB UR QL: NEGATIVE
BUN SERPL-MCNC: 24 MG/DL (ref 6–20)
BUN/CREAT SERPL: 31 (ref 12–20)
CA-I BLD-MCNC: 9.4 MG/DL (ref 8.5–10.1)
CHLORIDE SERPL-SCNC: 106 MMOL/L (ref 97–108)
CO2 SERPL-SCNC: 27 MMOL/L (ref 21–32)
COLOR UR: ABNORMAL
CREAT SERPL-MCNC: 0.77 MG/DL (ref 0.55–1.02)
DIFFERENTIAL METHOD BLD: ABNORMAL
EOSINOPHIL # BLD: 0.2 K/UL (ref 0–0.4)
EOSINOPHIL NFR BLD: 2 % (ref 0–7)
EPITH CASTS URNS QL MICRO: ABNORMAL /LPF
ERYTHROCYTE [DISTWIDTH] IN BLOOD BY AUTOMATED COUNT: 13.2 % (ref 11.5–14.5)
GLOBULIN SER CALC-MCNC: 2.9 G/DL (ref 2–4)
GLUCOSE SERPL-MCNC: 71 MG/DL (ref 65–100)
GLUCOSE UR STRIP.AUTO-MCNC: NEGATIVE MG/DL
HCT VFR BLD AUTO: 38.8 % (ref 35–47)
HGB BLD-MCNC: 12.9 G/DL (ref 11.5–16)
HGB UR QL STRIP: ABNORMAL
IMM GRANULOCYTES # BLD AUTO: 0 K/UL (ref 0–0.04)
IMM GRANULOCYTES NFR BLD AUTO: 0 % (ref 0–0.5)
KETONES UR QL STRIP.AUTO: NEGATIVE MG/DL
LEUKOCYTE ESTERASE UR QL STRIP.AUTO: NEGATIVE
LIPASE SERPL-CCNC: 48 U/L (ref 13–75)
LYMPHOCYTES # BLD: 4.2 K/UL (ref 0.8–3.5)
LYMPHOCYTES NFR BLD: 40 % (ref 12–49)
MCH RBC QN AUTO: 31.2 PG (ref 26–34)
MCHC RBC AUTO-ENTMCNC: 33.2 G/DL (ref 30–36.5)
MCV RBC AUTO: 93.7 FL (ref 80–99)
MONOCYTES # BLD: 0.7 K/UL (ref 0–1)
MONOCYTES NFR BLD: 6 % (ref 5–13)
NEUTS SEG # BLD: 5.4 K/UL (ref 1.8–8)
NEUTS SEG NFR BLD: 51 % (ref 32–75)
NITRITE UR QL STRIP.AUTO: NEGATIVE
NRBC # BLD: 0 K/UL (ref 0–0.01)
NRBC BLD-RTO: 0 PER 100 WBC
PH UR STRIP: 6 (ref 5–8)
PLATELET # BLD AUTO: 245 K/UL (ref 150–400)
PMV BLD AUTO: 10.3 FL (ref 8.9–12.9)
POTASSIUM SERPL-SCNC: 4.2 MMOL/L (ref 3.5–5.1)
PROT SERPL-MCNC: 6.8 G/DL (ref 6.4–8.2)
PROT UR STRIP-MCNC: NEGATIVE MG/DL
RBC # BLD AUTO: 4.14 M/UL (ref 3.8–5.2)
RBC #/AREA URNS HPF: ABNORMAL /HPF (ref 0–5)
SODIUM SERPL-SCNC: 138 MMOL/L (ref 136–145)
SP GR UR REFRACTOMETRY: 1 (ref 1–1.03)
URINE CULTURE IF INDICATED: ABNORMAL
UROBILINOGEN UR QL STRIP.AUTO: 0.1 EU/DL (ref 0.1–1)
WBC # BLD AUTO: 10.6 K/UL (ref 3.6–11)
WBC URNS QL MICRO: ABNORMAL /HPF (ref 0–4)

## 2024-05-06 PROCEDURE — 80053 COMPREHEN METABOLIC PANEL: CPT

## 2024-05-06 PROCEDURE — 99284 EMERGENCY DEPT VISIT MOD MDM: CPT

## 2024-05-06 PROCEDURE — 74176 CT ABD & PELVIS W/O CONTRAST: CPT

## 2024-05-06 PROCEDURE — 81001 URINALYSIS AUTO W/SCOPE: CPT

## 2024-05-06 PROCEDURE — 83690 ASSAY OF LIPASE: CPT

## 2024-05-06 PROCEDURE — 36415 COLL VENOUS BLD VENIPUNCTURE: CPT

## 2024-05-06 PROCEDURE — 85025 COMPLETE CBC W/AUTO DIFF WBC: CPT

## 2024-05-06 RX ORDER — KETOROLAC TROMETHAMINE 15 MG/ML
15 INJECTION, SOLUTION INTRAMUSCULAR; INTRAVENOUS
Status: DISCONTINUED | OUTPATIENT
Start: 2024-05-06 | End: 2024-05-07 | Stop reason: HOSPADM

## 2024-05-06 RX ORDER — MAGNESIUM CARB/ALUMINUM HYDROX 105-160MG
148 TABLET,CHEWABLE ORAL ONCE
Qty: 296 ML | Refills: 0 | Status: SHIPPED | OUTPATIENT
Start: 2024-05-06 | End: 2024-05-06

## 2024-05-06 ASSESSMENT — PAIN - FUNCTIONAL ASSESSMENT
PAIN_FUNCTIONAL_ASSESSMENT: 0-10
PAIN_FUNCTIONAL_ASSESSMENT: 0-10

## 2024-05-06 ASSESSMENT — LIFESTYLE VARIABLES
HOW OFTEN DO YOU HAVE A DRINK CONTAINING ALCOHOL: NEVER
HOW MANY STANDARD DRINKS CONTAINING ALCOHOL DO YOU HAVE ON A TYPICAL DAY: PATIENT DOES NOT DRINK

## 2024-05-06 ASSESSMENT — PAIN SCALES - GENERAL
PAINLEVEL_OUTOF10: 1
PAINLEVEL_OUTOF10: 2

## 2024-05-07 NOTE — DISCHARGE INSTRUCTIONS
Thank you!  Thank you for allowing me to care for you in the emergency department. It is my goal to provide you with excellent care.  Please fill out the survey that will come to you by mail or email since we listen to your feedback!     Below you will find a list of your tests from today's visit.  Should you have any questions, please do not hesitate to call the emergency department.    Labs  Recent Results (from the past 12 hour(s))   CMP    Collection Time: 05/06/24  9:17 PM   Result Value Ref Range    Sodium 138 136 - 145 mmol/L    Potassium 4.2 3.5 - 5.1 mmol/L    Chloride 106 97 - 108 mmol/L    CO2 27 21 - 32 mmol/L    Anion Gap 5 5 - 15 mmol/L    Glucose 71 65 - 100 mg/dL    BUN 24 (H) 6 - 20 mg/dL    Creatinine 0.77 0.55 - 1.02 mg/dL    Bun/Cre Ratio 31 (H) 12 - 20      Est, Glom Filt Rate 84 >60 ml/min/1.73m2    Calcium 9.4 8.5 - 10.1 mg/dL    Total Bilirubin 0.4 0.2 - 1.0 mg/dL    AST 22 15 - 37 U/L    ALT 20 12 - 78 U/L    Alk Phosphatase 50 45 - 117 U/L    Total Protein 6.8 6.4 - 8.2 g/dL    Albumin 3.9 3.5 - 5.0 g/dL    Globulin 2.9 2.0 - 4.0 g/dL    Albumin/Globulin Ratio 1.3 1.1 - 2.2     CBC with Auto Differential    Collection Time: 05/06/24  9:17 PM   Result Value Ref Range    WBC 10.6 3.6 - 11.0 K/uL    RBC 4.14 3.80 - 5.20 M/uL    Hemoglobin 12.9 11.5 - 16.0 g/dL    Hematocrit 38.8 35.0 - 47.0 %    MCV 93.7 80.0 - 99.0 FL    MCH 31.2 26.0 - 34.0 PG    MCHC 33.2 30.0 - 36.5 g/dL    RDW 13.2 11.5 - 14.5 %    Platelets 245 150 - 400 K/uL    MPV 10.3 8.9 - 12.9 FL    Nucleated RBCs 0.0 0.0  WBC    nRBC 0.00 0.00 - 0.01 K/uL    Neutrophils % 51 32 - 75 %    Lymphocytes % 40 12 - 49 %    Monocytes % 6 5 - 13 %    Eosinophils % 2 0 - 7 %    Basophils % 1 0 - 1 %    Immature Granulocytes % 0 0 - 0.5 %    Neutrophils Absolute 5.4 1.8 - 8.0 K/UL    Lymphocytes Absolute 4.2 (H) 0.8 - 3.5 K/UL    Monocytes Absolute 0.7 0.0 - 1.0 K/UL    Eosinophils Absolute 0.2 0.0 - 0.4 K/UL    Basophils

## 2024-05-07 NOTE — ED TRIAGE NOTES
Patient states she has been constipated for the past three weeks. Patient has been taking fiber, milk of magnesia, and enemas at home without relief. Patient states that she has passed a little stool today but is now having blood while she wipes.    Patient has a history of an abdominal mass, does not see GI currently.

## 2024-05-07 NOTE — ED PROVIDER NOTES
[facility] EMERGENCY DEPT  EMERGENCY DEPARTMENT HISTORY AND PHYSICAL EXAM      Date: 5/6/2024  Patient Name: Gretchen Selby  MRN: [id number]  Birthdate 1955  Date of evaluation: 5/6/2024  Provider: Deshawn Cody MD   Note Started: 8:57 PM EDT 5/6/24    HISTORY OF PRESENT ILLNESS     Chief Complaint   Patient presents with    Constipation       History Provided By: Patient    HPI: Gretchen Selby is a 68 y.o. female presents for evaluation of constipation.  Patient ports taking multiple over-the-counter medications without relief for the past 3 weeks.  Patient does report some associated lower abdominal pain, states she had small hard stools today that had noted blood.  Patient reports history of intra-abdominal mass last time she had similar symptoms.  Denies nausea or vomiting.  Denies fevers or chills    PAST MEDICAL HISTORY   Past Medical History:  Past Medical History:   Diagnosis Date    Headache 9/2019    HTN (hypertension)     Hypothyroidism        Past Surgical History:  Past Surgical History:   Procedure Laterality Date    HYSTERECTOMY (CERVIX STATUS UNKNOWN)  1979       Family History:  Family History   Problem Relation Age of Onset    Stroke Father     Mult Sclerosis Other     Heart Disease Father     Headache Other         cluster    Breast Cancer Mother        Social History:  Social History     Tobacco Use    Smoking status: Never     Passive exposure: Yes    Smokeless tobacco: Never   Substance Use Topics    Alcohol use: Yes     Alcohol/week: 1.0 standard drink of alcohol    Drug use: Never       Allergies:  Allergies   Allergen Reactions    Iodinated Contrast Media Other (See Comments)     \"BP plummets\"       PCP: Yosef Zamora MD    Current Meds:   Current Facility-Administered Medications   Medication Dose Route Frequency Provider Last Rate Last Admin    ketorolac (TORADOL) injection 15 mg  15 mg IntraVENous NOW Deshawn Cody MD         Current Outpatient Medications

## 2024-05-08 ENCOUNTER — APPOINTMENT (OUTPATIENT)
Facility: HOSPITAL | Age: 69
End: 2024-05-08
Payer: COMMERCIAL

## 2024-05-08 ENCOUNTER — HOSPITAL ENCOUNTER (EMERGENCY)
Facility: HOSPITAL | Age: 69
Discharge: HOME | End: 2024-05-09
Payer: COMMERCIAL

## 2024-05-08 DIAGNOSIS — K59.00 CONSTIPATION, UNSPECIFIED CONSTIPATION TYPE: Primary | ICD-10-CM

## 2024-05-08 LAB
ALBUMIN SERPL-MCNC: 3.8 G/DL (ref 3.5–5)
ALBUMIN/GLOB SERPL: 1.3 (ref 1.1–2.2)
ALP SERPL-CCNC: 45 U/L (ref 45–117)
ALT SERPL-CCNC: 17 U/L (ref 12–78)
ANION GAP SERPL CALC-SCNC: 5 MMOL/L (ref 5–15)
AST SERPL W P-5'-P-CCNC: 19 U/L (ref 15–37)
BASOPHILS # BLD: 0.1 K/UL (ref 0–0.1)
BASOPHILS NFR BLD: 1 % (ref 0–1)
BILIRUB SERPL-MCNC: 0.6 MG/DL (ref 0.2–1)
BUN SERPL-MCNC: 10 MG/DL (ref 6–20)
BUN/CREAT SERPL: 14 (ref 12–20)
CA-I BLD-MCNC: 9.4 MG/DL (ref 8.5–10.1)
CHLORIDE SERPL-SCNC: 107 MMOL/L (ref 97–108)
CO2 SERPL-SCNC: 24 MMOL/L (ref 21–32)
CREAT SERPL-MCNC: 0.72 MG/DL (ref 0.55–1.02)
DIFFERENTIAL METHOD BLD: ABNORMAL
EOSINOPHIL # BLD: 0.2 K/UL (ref 0–0.4)
EOSINOPHIL NFR BLD: 3 % (ref 0–7)
ERYTHROCYTE [DISTWIDTH] IN BLOOD BY AUTOMATED COUNT: 13.1 % (ref 11.5–14.5)
GLOBULIN SER CALC-MCNC: 3 G/DL (ref 2–4)
GLUCOSE SERPL-MCNC: 80 MG/DL (ref 65–100)
HCT VFR BLD AUTO: 39.7 % (ref 35–47)
HGB BLD-MCNC: 13.3 G/DL (ref 11.5–16)
IMM GRANULOCYTES # BLD AUTO: 0 K/UL (ref 0–0.04)
IMM GRANULOCYTES NFR BLD AUTO: 0 % (ref 0–0.5)
LYMPHOCYTES # BLD: 4.1 K/UL (ref 0.8–3.5)
LYMPHOCYTES NFR BLD: 47 % (ref 12–49)
MCH RBC QN AUTO: 31.1 PG (ref 26–34)
MCHC RBC AUTO-ENTMCNC: 33.5 G/DL (ref 30–36.5)
MCV RBC AUTO: 93 FL (ref 80–99)
MONOCYTES # BLD: 0.6 K/UL (ref 0–1)
MONOCYTES NFR BLD: 7 % (ref 5–13)
NEUTS SEG # BLD: 3.7 K/UL (ref 1.8–8)
NEUTS SEG NFR BLD: 42 % (ref 32–75)
NRBC # BLD: 0 K/UL (ref 0–0.01)
NRBC BLD-RTO: 0 PER 100 WBC
PLATELET # BLD AUTO: 245 K/UL (ref 150–400)
PMV BLD AUTO: 10.2 FL (ref 8.9–12.9)
POTASSIUM SERPL-SCNC: 4.1 MMOL/L (ref 3.5–5.1)
PROT SERPL-MCNC: 6.8 G/DL (ref 6.4–8.2)
RBC # BLD AUTO: 4.27 M/UL (ref 3.8–5.2)
SODIUM SERPL-SCNC: 136 MMOL/L (ref 136–145)
WBC # BLD AUTO: 8.7 K/UL (ref 3.6–11)

## 2024-05-08 PROCEDURE — 96374 THER/PROPH/DIAG INJ IV PUSH: CPT

## 2024-05-08 PROCEDURE — 80053 COMPREHEN METABOLIC PANEL: CPT

## 2024-05-08 PROCEDURE — 36415 COLL VENOUS BLD VENIPUNCTURE: CPT

## 2024-05-08 PROCEDURE — 85025 COMPLETE CBC W/AUTO DIFF WBC: CPT

## 2024-05-08 PROCEDURE — 2580000003 HC RX 258: Performed by: NURSE PRACTITIONER

## 2024-05-08 PROCEDURE — 99284 EMERGENCY DEPT VISIT MOD MDM: CPT

## 2024-05-08 RX ORDER — 0.9 % SODIUM CHLORIDE 0.9 %
1000 INTRAVENOUS SOLUTION INTRAVENOUS ONCE
Status: COMPLETED | OUTPATIENT
Start: 2024-05-08 | End: 2024-05-09

## 2024-05-08 RX ADMIN — SODIUM CHLORIDE 1000 ML: 9 INJECTION, SOLUTION INTRAVENOUS at 23:32

## 2024-05-08 ASSESSMENT — PAIN SCALES - GENERAL: PAINLEVEL_OUTOF10: 1

## 2024-05-08 ASSESSMENT — PAIN - FUNCTIONAL ASSESSMENT: PAIN_FUNCTIONAL_ASSESSMENT: 0-10

## 2024-05-09 ENCOUNTER — APPOINTMENT (OUTPATIENT)
Facility: HOSPITAL | Age: 69
End: 2024-05-09
Payer: COMMERCIAL

## 2024-05-09 VITALS
HEIGHT: 67 IN | WEIGHT: 135 LBS | DIASTOLIC BLOOD PRESSURE: 79 MMHG | TEMPERATURE: 97.7 F | SYSTOLIC BLOOD PRESSURE: 124 MMHG | HEART RATE: 64 BPM | OXYGEN SATURATION: 98 % | BODY MASS INDEX: 21.19 KG/M2 | RESPIRATION RATE: 16 BRPM

## 2024-05-09 PROCEDURE — 96374 THER/PROPH/DIAG INJ IV PUSH: CPT

## 2024-05-09 PROCEDURE — 6360000002 HC RX W HCPCS: Performed by: NURSE PRACTITIONER

## 2024-05-09 PROCEDURE — 74176 CT ABD & PELVIS W/O CONTRAST: CPT

## 2024-05-09 RX ORDER — METOCLOPRAMIDE HYDROCHLORIDE 5 MG/ML
10 INJECTION INTRAMUSCULAR; INTRAVENOUS ONCE
Status: COMPLETED | OUTPATIENT
Start: 2024-05-09 | End: 2024-05-09

## 2024-05-09 RX ORDER — METOCLOPRAMIDE 10 MG/1
10 TABLET ORAL 2 TIMES DAILY
Qty: 14 TABLET | Refills: 0 | Status: SHIPPED | OUTPATIENT
Start: 2024-05-09 | End: 2024-05-16

## 2024-05-09 RX ADMIN — METOCLOPRAMIDE 10 MG: 5 INJECTION, SOLUTION INTRAMUSCULAR; INTRAVENOUS at 03:51

## 2024-05-09 ASSESSMENT — PAIN - FUNCTIONAL ASSESSMENT: PAIN_FUNCTIONAL_ASSESSMENT: NONE - DENIES PAIN

## 2024-05-09 NOTE — ED TRIAGE NOTES
Patient states she has been having constipation for approx. 3 weeks; patient states she has tried fiber and probiotic intake, and OTC Miralax with no relief; pt seen here 2 days ago for the same thing, told to take Mag citrate; pt has taken the mag citrate and now feels weak and dehydrated; pt states she has only had brown-colored watery output since taking all of her medication    Patient states she has also taken glycerin suppositories and lactulose and multiple fleets enema

## 2024-05-09 NOTE — DISCHARGE INSTRUCTIONS
Thank you!  Thank you for allowing me to care for you in the emergency department. It is my goal to provide you with excellent care.  Please fill out the survey that will come to you by mail or email since we listen to your feedback!     Below you will find a list of your tests from today's visit.  Should you have any questions, please do not hesitate to call the emergency department.    Labs  Recent Results (from the past 12 hour(s))   CBC with Auto Differential    Collection Time: 05/08/24 10:50 PM   Result Value Ref Range    WBC 8.7 3.6 - 11.0 K/uL    RBC 4.27 3.80 - 5.20 M/uL    Hemoglobin 13.3 11.5 - 16.0 g/dL    Hematocrit 39.7 35.0 - 47.0 %    MCV 93.0 80.0 - 99.0 FL    MCH 31.1 26.0 - 34.0 PG    MCHC 33.5 30.0 - 36.5 g/dL    RDW 13.1 11.5 - 14.5 %    Platelets 245 150 - 400 K/uL    MPV 10.2 8.9 - 12.9 FL    Nucleated RBCs 0.0 0.0  WBC    nRBC 0.00 0.00 - 0.01 K/uL    Neutrophils % 42 32 - 75 %    Lymphocytes % 47 12 - 49 %    Monocytes % 7 5 - 13 %    Eosinophils % 3 0 - 7 %    Basophils % 1 0 - 1 %    Immature Granulocytes % 0 0 - 0.5 %    Neutrophils Absolute 3.7 1.8 - 8.0 K/UL    Lymphocytes Absolute 4.1 (H) 0.8 - 3.5 K/UL    Monocytes Absolute 0.6 0.0 - 1.0 K/UL    Eosinophils Absolute 0.2 0.0 - 0.4 K/UL    Basophils Absolute 0.1 0.0 - 0.1 K/UL    Immature Granulocytes Absolute 0.0 0.00 - 0.04 K/UL    Differential Type AUTOMATED     Comprehensive Metabolic Panel    Collection Time: 05/08/24 10:50 PM   Result Value Ref Range    Sodium 136 136 - 145 mmol/L    Potassium 4.1 3.5 - 5.1 mmol/L    Chloride 107 97 - 108 mmol/L    CO2 24 21 - 32 mmol/L    Anion Gap 5 5 - 15 mmol/L    Glucose 80 65 - 100 mg/dL    BUN 10 6 - 20 mg/dL    Creatinine 0.72 0.55 - 1.02 mg/dL    Bun/Cre Ratio 14 12 - 20      Est, Glom Filt Rate >90 >60 ml/min/1.73m2    Calcium 9.4 8.5 - 10.1 mg/dL    Total Bilirubin 0.6 0.2 - 1.0 mg/dL    AST 19 15 - 37 U/L    ALT 17 12 - 78 U/L    Alk Phosphatase 45 45 - 117 U/L    Total

## 2024-05-09 NOTE — ED PROVIDER NOTES
[facility] EMERGENCY DEPT  EMERGENCY DEPARTMENT HISTORY AND PHYSICAL EXAM      Date: 5/8/2024  Patient Name: Gretchen Selby  MRN: [id number]  Birthdate 1955  Date of evaluation: 5/8/2024  Provider: ESSIE Rose NP   Note Started: 11:37 PM EDT 5/8/24    HISTORY OF PRESENT ILLNESS     Chief Complaint   Patient presents with    Constipation       History Provided By: Patient    HPI: Gretchen Selby is a 68 y.o. female with a past medical history as noted below presents to the emergency room with cc of constipation.  Patient states she has been having trouble with constipation x 3 weeks.  She states she was seen here 2 days ago for the same and had a CT showing moderate constipation.  She states she has tried taking magnesium citrate, glycerin suppositories, fleets enema and lactulose with no improvement.  She states she is passing gas and brown liquids only.  She states she feels bloated and the left side of her abdomen and lower back feel tight and sore.  No trauma.  She denies any associated fever/chills, nausea/vomiting or urinary symptoms.  She states she has a terrible headache and thinks she is dehydrated.  No cough, URI symptoms, dizziness, CP or SOB.  She states her PCP advised her she needed to go to the ER again for a CT with contrast.     PAST MEDICAL HISTORY   Past Medical History:  Past Medical History:   Diagnosis Date    Headache 9/2019    HTN (hypertension)     Hypothyroidism        Past Surgical History:  Past Surgical History:   Procedure Laterality Date    HYSTERECTOMY (CERVIX STATUS UNKNOWN)  1979       Family History:  Family History   Problem Relation Age of Onset    Stroke Father     Mult Sclerosis Other     Heart Disease Father     Headache Other         cluster    Breast Cancer Mother        Social History:  Social History     Tobacco Use    Smoking status: Every Day     Current packs/day: 0.25     Types: Cigarettes     Passive exposure: Yes    Smokeless tobacco: Never   Substance Use Topics        PHYSICAL EXAM   Physical Exam  Vitals and nursing note reviewed.   Constitutional:       Appearance: Normal appearance. She is not toxic-appearing.   HENT:      Head: Normocephalic and atraumatic.      Nose: Nose normal.      Mouth/Throat:      Mouth: Mucous membranes are moist.   Eyes:      Extraocular Movements: Extraocular movements intact.      Conjunctiva/sclera: Conjunctivae normal.   Cardiovascular:      Rate and Rhythm: Normal rate and regular rhythm.   Pulmonary:      Effort: Pulmonary effort is normal. No tachypnea, accessory muscle usage or respiratory distress.      Breath sounds: Normal breath sounds.   Abdominal:      General: Abdomen is flat. Bowel sounds are normal. There is no distension.      Palpations: Abdomen is soft.      Tenderness: There is abdominal tenderness in the left upper quadrant. There is no right CVA tenderness, left CVA tenderness, guarding or rebound. Negative signs include Valle's sign, Rovsing's sign and McBurney's sign.      Comments: Mild tenderness mid to LUQ abdomen   Musculoskeletal:         General: Normal range of motion.      Cervical back: Normal range of motion.   Skin:     General: Skin is warm.   Neurological:      General: No focal deficit present.      Mental Status: She is alert. Mental status is at baseline.   Psychiatric:         Mood and Affect: Mood is anxious.         Behavior: Behavior is cooperative.         SCREENINGS                   LAB, EKG AND DIAGNOSTIC RESULTS   Labs:  Admission on 05/08/2024, Discharged on 05/09/2024   Component Date Value    WBC 05/08/2024 8.7     RBC 05/08/2024 4.27     Hemoglobin 05/08/2024 13.3     Hematocrit 05/08/2024 39.7     MCV 05/08/2024 93.0     MCH 05/08/2024 31.1     MCHC 05/08/2024 33.5     RDW 05/08/2024 13.1     Platelets 05/08/2024 245     MPV 05/08/2024 10.2     Nucleated RBCs 05/08/2024 0.0     nRBC 05/08/2024 0.00     Neutrophils % 05/08/2024 42     Lymphocytes % 05/08/2024 47     Monocytes % 05/08/2024 7

## 2024-05-09 NOTE — ED NOTES
Provider spoke to patient and discussed plan to discharge patient. Take home med and ff up instructions given and patient verbalized understanding. Vitals rechecked, IV line removed.     Discharged patient in good condition, alert and oriented. Ambulatory upon discharge accompanied by family.

## 2024-05-16 ENCOUNTER — TELEPHONE (OUTPATIENT)
Age: 69
End: 2024-05-16

## 2024-05-16 NOTE — TELEPHONE ENCOUNTER
RE:Ubrelvy  Key: [id number]           Requested added and submitted via CMM awaiting update.      Rcvd message via CMM:    This request cannot be processed due to the member's coverage has terminated. Resubmit using active member ID information.     Submitted with ID#  [id number]

## 2024-05-30 ENCOUNTER — TRANSCRIBE ORDERS (OUTPATIENT)
Facility: HOSPITAL | Age: 69
End: 2024-05-30

## 2024-05-30 DIAGNOSIS — R51.9 CHRONIC INTRACTABLE HEADACHE, UNSPECIFIED HEADACHE TYPE: Primary | ICD-10-CM

## 2024-05-30 DIAGNOSIS — R51.9 NONINTRACTABLE HEADACHE, UNSPECIFIED CHRONICITY PATTERN, UNSPECIFIED HEADACHE TYPE: Primary | ICD-10-CM

## 2024-05-30 DIAGNOSIS — G89.29 CHRONIC INTRACTABLE HEADACHE, UNSPECIFIED HEADACHE TYPE: Primary | ICD-10-CM

## 2024-05-31 ENCOUNTER — APPOINTMENT (OUTPATIENT)
Facility: HOSPITAL | Age: 69
End: 2024-05-31
Attending: FAMILY MEDICINE
Payer: COMMERCIAL

## 2024-05-31 ENCOUNTER — HOSPITAL ENCOUNTER (OUTPATIENT)
Facility: HOSPITAL | Age: 69
Discharge: HOME | End: 2024-05-31
Attending: FAMILY MEDICINE
Payer: COMMERCIAL

## 2024-05-31 DIAGNOSIS — R51.9 NONINTRACTABLE HEADACHE, UNSPECIFIED CHRONICITY PATTERN, UNSPECIFIED HEADACHE TYPE: ICD-10-CM

## 2024-05-31 PROCEDURE — 70450 CT HEAD/BRAIN W/O DYE: CPT

## 2025-04-09 ENCOUNTER — OFFICE VISIT (OUTPATIENT)
Age: 70
End: 2025-04-09
Payer: COMMERCIAL

## 2025-04-09 VITALS
SYSTOLIC BLOOD PRESSURE: 118 MMHG | WEIGHT: 135 LBS | TEMPERATURE: 96.9 F | RESPIRATION RATE: 18 BRPM | DIASTOLIC BLOOD PRESSURE: 70 MMHG | BODY MASS INDEX: 21.19 KG/M2 | HEIGHT: 67 IN | OXYGEN SATURATION: 100 % | HEART RATE: 57 BPM

## 2025-04-09 DIAGNOSIS — G43.109 MIGRAINE WITH AURA, NOT INTRACTABLE, WITHOUT STATUS MIGRAINOSUS: ICD-10-CM

## 2025-04-09 PROCEDURE — 1123F ACP DISCUSS/DSCN MKR DOCD: CPT | Performed by: NURSE PRACTITIONER

## 2025-04-09 PROCEDURE — 99214 OFFICE O/P EST MOD 30 MIN: CPT | Performed by: NURSE PRACTITIONER

## 2025-04-09 RX ORDER — UBROGEPANT 100 MG/1
TABLET ORAL
Qty: 16 TABLET | Refills: 11 | Status: SHIPPED | OUTPATIENT
Start: 2025-04-09

## 2025-04-09 NOTE — PROGRESS NOTES
Gretchen Selby is a 69 y.o. female who presents with the following  Chief Complaint   Patient presents with    Migraine     Patient states she has had 1 migrianes in the last 4 months. Patient states ubrelvy works well.       HPI       Yearly follow-up for migraines  Doing well   Has only had 1 migraine in the last 4 months.   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     She does use the Ubrelvy and this works significantly quick  She has failed Nurtec and it made her feel like she had an upset stomach  She has also failed Imitrex and Relpax and Maxalt     Migraines are unilateral with sharp stabbing pain  Hypersensitive      [place] 70th this [place].     Allergies   Allergen Reactions    Iodinated Contrast Media Other (See Comments)     \"BP plummets\"       Current Outpatient Medications   Medication Sig Dispense Refill    Ubrogepant (UBRELVY) 100 MG TABS TAKE ONE TABLET BY MOUTH AT THE ONSET OF A HEADACHE MAY REPEAT IN 2 HOURS IF NEEDED (NO MORE THAN 2 TABLETS IN 24 HOURS 16 tablet 11    acetaminophen (TYLENOL) 325 MG tablet Take 2 tablets by mouth every 4 hours as needed      Cholecalciferol 50 MCG (2000 UT) CAPS Take 5 capsules by mouth      spironolactone (ALDACTONE) 100 MG tablet Take 2 tablets by mouth daily      Thyroid 48.75 MG TABS        No current facility-administered medications for this visit.        Social History     Tobacco Use   Smoking Status Every Day    Current packs/day: 0.25    Types: Cigarettes    Passive exposure: Yes   Smokeless Tobacco Never       Past Medical History:   Diagnosis Date    Headache 9/2019    HTN (hypertension)     Hypothyroidism        Past Surgical History:   Procedure Laterality Date    HYSTERECTOMY (CERVIX STATUS UNKNOWN)  1979

## 2025-08-12 ENCOUNTER — APPOINTMENT (OUTPATIENT)
Facility: HOSPITAL | Age: 70
End: 2025-08-12
Payer: COMMERCIAL

## 2025-08-12 ENCOUNTER — HOSPITAL ENCOUNTER (EMERGENCY)
Facility: HOSPITAL | Age: 70
Discharge: HOME | End: 2025-08-12
Attending: FAMILY MEDICINE
Payer: COMMERCIAL

## 2025-08-12 VITALS
SYSTOLIC BLOOD PRESSURE: 116 MMHG | WEIGHT: 135 LBS | TEMPERATURE: 98.4 F | HEART RATE: 76 BPM | DIASTOLIC BLOOD PRESSURE: 88 MMHG | HEIGHT: 67 IN | BODY MASS INDEX: 21.19 KG/M2 | OXYGEN SATURATION: 98 % | RESPIRATION RATE: 16 BRPM

## 2025-08-12 DIAGNOSIS — S39.012A STRAIN OF LUMBAR PARASPINOUS MUSCLE, INITIAL ENCOUNTER: ICD-10-CM

## 2025-08-12 DIAGNOSIS — M47.816 OSTEOARTHRITIS OF LUMBAR SPINE, UNSPECIFIED SPINAL OSTEOARTHRITIS COMPLICATION STATUS: ICD-10-CM

## 2025-08-12 DIAGNOSIS — M54.50 ACUTE RIGHT-SIDED LOW BACK PAIN WITHOUT SCIATICA: Primary | ICD-10-CM

## 2025-08-12 PROCEDURE — 96372 THER/PROPH/DIAG INJ SC/IM: CPT

## 2025-08-12 PROCEDURE — 72100 X-RAY EXAM L-S SPINE 2/3 VWS: CPT

## 2025-08-12 PROCEDURE — 6360000002 HC RX W HCPCS: Performed by: FAMILY MEDICINE

## 2025-08-12 PROCEDURE — 99284 EMERGENCY DEPT VISIT MOD MDM: CPT

## 2025-08-12 RX ORDER — LIDOCAINE 4 G/G
1 PATCH TOPICAL
Status: DISCONTINUED | OUTPATIENT
Start: 2025-08-12 | End: 2025-08-12 | Stop reason: HOSPADM

## 2025-08-12 RX ORDER — LIDOCAINE 4 G/G
PATCH TOPICAL
Qty: 5 EACH | Refills: 0 | Status: SHIPPED | OUTPATIENT
Start: 2025-08-12

## 2025-08-12 RX ORDER — KETOROLAC TROMETHAMINE 30 MG/ML
30 INJECTION, SOLUTION INTRAMUSCULAR; INTRAVENOUS
Status: COMPLETED | OUTPATIENT
Start: 2025-08-12 | End: 2025-08-12

## 2025-08-12 RX ORDER — PREDNISONE 20 MG/1
40 TABLET ORAL DAILY
Qty: 10 TABLET | Refills: 0 | Status: SHIPPED | OUTPATIENT
Start: 2025-08-12 | End: 2025-08-17

## 2025-08-12 RX ADMIN — KETOROLAC TROMETHAMINE 30 MG: 30 INJECTION, SOLUTION INTRAMUSCULAR at 16:53

## 2025-08-12 ASSESSMENT — PAIN - FUNCTIONAL ASSESSMENT: PAIN_FUNCTIONAL_ASSESSMENT: 0-10

## 2025-08-12 ASSESSMENT — PAIN DESCRIPTION - LOCATION: LOCATION: BACK

## 2025-08-12 ASSESSMENT — PAIN SCALES - GENERAL: PAINLEVEL_OUTOF10: 9
